# Patient Record
Sex: MALE | Race: WHITE | NOT HISPANIC OR LATINO | Employment: UNEMPLOYED | URBAN - METROPOLITAN AREA
[De-identification: names, ages, dates, MRNs, and addresses within clinical notes are randomized per-mention and may not be internally consistent; named-entity substitution may affect disease eponyms.]

---

## 2017-05-18 ENCOUNTER — OFFICE VISIT (OUTPATIENT)
Dept: URGENT CARE | Age: 22
End: 2017-05-18
Payer: COMMERCIAL

## 2017-05-18 PROCEDURE — S9088 SERVICES PROVIDED IN URGENT: HCPCS | Performed by: FAMILY MEDICINE

## 2017-05-18 PROCEDURE — 99203 OFFICE O/P NEW LOW 30 MIN: CPT | Performed by: FAMILY MEDICINE

## 2017-11-02 ENCOUNTER — ALLSCRIPTS OFFICE VISIT (OUTPATIENT)
Dept: OTHER | Facility: OTHER | Age: 22
End: 2017-11-02

## 2017-11-04 NOTE — PROGRESS NOTES
Assessment  1  Acute pharyngitis (462) (J02 9)   2  Foreign body of ear, right (931) (T16 1XXA)    Plan  Acute pharyngitis    · Amoxicillin-Pot Clavulanate 875-125 MG Oral Tablet; take one tablet twice daily for  10 days   · MethylPREDNISolone 4 MG Oral Tablet; USE AS DIRECTED   · Call [phone] if:  The fever has not gone away in 2 days ; Status:Complete;   Done:  38MUR5655   · Call [phone] if: The symptoms are not better in 7 days ; Status:Complete;   Done:  22RZP9815   · Call [phone] if: You develop a cough ; Status:Complete;   Done: 66GBC7783   · Call [phone] if: You get a rash ; Status:Complete;   Done: 90HAA9567   · Call [phone] if: You have pain in your ear ; Status:Complete;   Done: 48DLO1599   · Call [phone] if: You start vomiting ; Status:Complete;   Done: 74ZEO4008   · Call [phone] if: Your joints are red or painful ; Status:Complete;   Done:  08WWJ3391   · Call [phone] if: Your sore throat is not better in 1 week ; Status:Complete;   Done:  01XFF8422   · Call [phone] if: Your swallowing difficulty is not better in 3 days ; Status:Complete;    Done: 00OMD2556   · Call [phone] if: Your temperature is higher than 101F ; Status:Complete;   Done:  95JIJ5746   · Call 911 if: You have difficulty breathing, or you are short of breath more often ;  Status:Complete;   Done: 15JPH5230   · Seek Immediate Medical Attention if: You become dehydrated ; Status:Complete;   Done:  32BPL9716   · Seek Immediate Medical Attention if: You get a severe headache that seems different  from your usual ones ; Status:Complete;   Done: 16YQM4891   · Seek Immediate Medical Attention if: Your swallowing difficulty is worse ;  Status:Complete;   Done: 67KGU3637   · Drink plenty of fluids ; Status:Complete;   Done: 93GIO4591   · Gargle with warm salt water for 5 minutes every 4 hours ; Status:Complete;   Done:  03RDT3387   · Good hand washing is one of the best ways to control the spread of germs ;  Status:Complete;   Done: 55RWY5454   · Take steps to prevent passing germs to others ; Status:Complete;   Done: 89HFZ7926   · The following home treatments may soothe a sore throat ; Status:Complete;   Done:  34DYR5629    Discussion/Summary    Pharyngitis: Take full course of abx  Will start corse of medrol dose pack as pt with significant edema as well  rest  stay well hydrated  salt water gargles  body R ear: a small piece of black possible plastic irrigated from your R ear  avoid q-tips or foreign objects  feeling better get your influenza  The patient was counseled regarding instructions for management,-- prognosis,-- patient and family education  Chief Complaint  Pt presents for sore throat, headache, sore abdomenyesterday morning  History of Present Illness  Cold Symptoms:   Deborah Fire presents with complaints of gradual onset of constant episodes of mild cold symptoms  Associated symptoms include scratchy throat-- and-- sore throat, but-- no nasal congestion,-- no dry cough,-- no productive cough,-- no facial pressure,-- no facial pain,-- no headache,-- no wheezing,-- no shortness of breath,-- no nausea,-- no vomiting,-- no fever-- and-- no chills  Review of Systems    Constitutional: feeling poorly, but-- no fever,-- no chills-- and-- not feeling tired  ENT: as noted in HPI,-- no earache-- and-- no hearing loss  Cardiovascular: the heart rate was not slow,-- no chest pain,-- the heart rate was not fast-- and-- no palpitations  Respiratory: no shortness of breath-- and-- no cough  Gastrointestinal: no abdominal pain,-- no nausea-- and-- no vomiting  Musculoskeletal: no arthralgias-- and-- no myalgias  Integumentary: no rashes  Neurological: no headache  Active Problems  1  Second degree burn of hand (944 20,948 00) (T20 872A)    Past Medical History  Active Problems And Past Medical History Reviewed:    The active problems and past medical history were reviewed and updated today  Surgical History  Surgical History Reviewed: The surgical history was reviewed and updated today  Social History   · Alcohol use (V49 89) (Z78 9)   · Caffeine use (V49 89) (F15 90)   · Chewing tobacco use (305 1) (Z72 0)   · Current smoker (305 1) (X38 598)   · No illicit drug use  The social history was reviewed and updated today  The social history was reviewed and is unchanged  Family History  Family History Reviewed: The family history was reviewed and updated today  Current Meds    The medication list was reviewed and updated today  Allergies  1  No Known Drug Allergies  2  No Known Environmental Allergies   3  Nuts    Vitals   Recorded: 94DHO7837 10:35AM   Temperature 99 F   Heart Rate 86   Systolic 397, LUE, Sitting   Diastolic 68, LUE, Sitting   BP CUFF SIZE Large   Height 5 ft 11 65 in   Weight 209 lb 3 2 oz   BMI Calculated 28 65   BSA Calculated 2 16   O2 Saturation 98, RA     Physical Exam    Constitutional   General appearance: No acute distress, well appearing and well nourished  Eyes   Conjunctiva and lids: No swelling, erythema, or discharge  Pupils and irises: Equal, round and reactive to light  Ears, Nose, Mouth, and Throat   External inspection of ears and nose: Normal     Otoscopic examination: Abnormal  -- black foreign body to R ear  minimal cerumen B/L  TM normal    Oropharynx: Abnormal   Oral mucosa was normal  There was 3+ enlargement, erythema and swelling of both tonsils exudate  -- MMM  Pulmonary   Respiratory effort: No increased work of breathing or signs of respiratory distress  Auscultation of lungs: Clear to auscultation, equal breath sounds bilaterally, no wheezes, no rales, no rhonci  -- no rhonchi or wheezing  Cardiovascular   Auscultation of heart: Normal rate and rhythm, normal S1 and S2, without murmurs      Lymphatic   Palpation of lymph nodes in neck: Abnormal  -- (+ B/L posterior cervical lymphadenopathy and tenderness)   Musculoskeletal   Gait and station: Normal     Skin   Skin and subcutaneous tissue: Normal without rashes or lesions  Psychiatric   Orientation to person, place and time: Normal     Mood and affect: Normal          Procedure            Indication: foreign body in the right ear  Procedure Note: The procedure was performed by the Provider  A otoscope was placed in the ear canal(s) to visualize the ear canal debris  The ear was cleaned by using warm water irrigation  The procedure was successful  Post-Procedure:   Patient Status: the patient tolerated the procedure well  Complications: there were no complications  Patient instructions: dry ear precautions,-- avoid using q-tips-- and-- Avoid foreign objects  Follow-up as needed  Signatures   Electronically signed by :  TRISH Meyer; Nov 2 2017 11:12AM EST                       (Author)    Electronically signed by : Rosmery Wei DO; Nov  3 2017 12:12PM EST

## 2018-01-12 VITALS
OXYGEN SATURATION: 98 % | TEMPERATURE: 99 F | SYSTOLIC BLOOD PRESSURE: 122 MMHG | HEART RATE: 86 BPM | WEIGHT: 209.2 LBS | DIASTOLIC BLOOD PRESSURE: 68 MMHG | HEIGHT: 72 IN | BODY MASS INDEX: 28.33 KG/M2

## 2018-01-30 ENCOUNTER — OFFICE VISIT (OUTPATIENT)
Dept: URGENT CARE | Facility: MEDICAL CENTER | Age: 23
End: 2018-01-30
Payer: COMMERCIAL

## 2018-01-30 VITALS — HEIGHT: 72 IN | WEIGHT: 207 LBS | TEMPERATURE: 98.6 F | BODY MASS INDEX: 28.04 KG/M2 | RESPIRATION RATE: 20 BRPM

## 2018-01-30 DIAGNOSIS — J02.9 SORE THROAT: Primary | ICD-10-CM

## 2018-01-30 DIAGNOSIS — S76.319A HAMSTRING STRAIN, INITIAL ENCOUNTER: ICD-10-CM

## 2018-01-30 LAB — S PYO AG THROAT QL: NEGATIVE

## 2018-01-30 PROCEDURE — 87430 STREP A AG IA: CPT | Performed by: PHYSICIAN ASSISTANT

## 2018-01-30 PROCEDURE — 87147 CULTURE TYPE IMMUNOLOGIC: CPT | Performed by: PHYSICIAN ASSISTANT

## 2018-01-30 PROCEDURE — 87070 CULTURE OTHR SPECIMN AEROBIC: CPT | Performed by: PHYSICIAN ASSISTANT

## 2018-01-30 PROCEDURE — 99213 OFFICE O/P EST LOW 20 MIN: CPT | Performed by: PHYSICIAN ASSISTANT

## 2018-01-30 PROCEDURE — S9088 SERVICES PROVIDED IN URGENT: HCPCS | Performed by: PHYSICIAN ASSISTANT

## 2018-01-30 NOTE — PATIENT INSTRUCTIONS
Continue Tylenol and Motrin  Can use Mucinex D over-the-counter for cough and congestion  We will check a throat culture but negative rapid strep here  Hamstring strain seems to be from some overuse  If redness or swelling of the leg go to the emergency room  Limit some activity and lifting and jumping for 1-2 weeks  Gentle hamstring stretching and can foam roll over the hamstrings and IT band  If not improved follow up with PCP

## 2018-01-30 NOTE — PROGRESS NOTES
Assessment/Plan:      Diagnoses and all orders for this visit:    Sore throat  -     Throat culture  -     POCT rapid strepA    Hamstring strain, initial encounter        Patient Instructions     Continue Tylenol and Motrin  Can use Mucinex D over-the-counter for cough and congestion  We will check a throat culture but negative rapid strep here  Hamstring strain seems to be from some overuse  If redness or swelling of the leg go to the emergency room  Limit some activity and lifting and jumping for 1-2 weeks  Gentle hamstring stretching and can foam roll over the hamstrings and IT band  If not improved follow up with PCP  Subjective:     Patient ID: Ghada Graves is a 25 y o  male  81-year-old male complains of 2 days of cough sore throat and congestion  He denies fever home  No flu shot this year  His girlfriend is sick  No nausea vomiting  No meds over-the-counter for the sore throat or cough  He also complains of left thigh in the back (posterior ) pain off and on for 3 weeks  He does play football but is out of season  He plays basketball right now and is lifting in the gym  He denies fall or injury  Says the pain is okay when he works out  But actually gets worse as he rest   He gets tight and stiff  No glue pain or lateral IP pain  No pain down below the knee  Review of Systems      Objective:     Physical Exam   Constitutional: He appears well-developed and well-nourished  No distress  HENT:   Right Ear: External ear normal    Left Ear: External ear normal    Nose: Nose normal  Right sinus exhibits no maxillary sinus tenderness and no frontal sinus tenderness  Left sinus exhibits no maxillary sinus tenderness and no frontal sinus tenderness  Mouth/Throat: No oropharyngeal exudate or posterior oropharyngeal erythema  Mild posterior oropharynx erythema  No exudates  Eyes: Conjunctivae and EOM are normal  Pupils are equal, round, and reactive to light   No scleral icterus  Neck: Normal range of motion  Neck supple  Cardiovascular: Normal rate, regular rhythm and normal heart sounds  Pulmonary/Chest: Effort normal and breath sounds normal  No respiratory distress  He has no wheezes  He has no rales  Abdominal: Soft  Bowel sounds are normal  He exhibits no distension and no mass  There is no tenderness  There is no rebound and no guarding  Musculoskeletal:     Left thigh supple mild tenderness along the distal hamstring muscle body but not to the insertions of the hamstrings  He has some tenderness along the proximal gastric  Pain with hamstring stretching and some tightness  He also has pain with gastrocs stretching but no tightness  He has no erythema induration of the skin  No wound  Full range of motion of the knee  No lateral ice he band or greater jemma tenderness  Full rotation of the hip  No glute tenderness  No weakness  Lymphadenopathy:     He has cervical adenopathy  Skin: Skin is warm and dry  No rash noted

## 2018-02-02 ENCOUNTER — TELEPHONE (OUTPATIENT)
Dept: URGENT CARE | Facility: MEDICAL CENTER | Age: 23
End: 2018-02-02

## 2018-02-02 LAB — BACTERIA THROAT CULT: ABNORMAL

## 2018-02-15 ENCOUNTER — OFFICE VISIT (OUTPATIENT)
Dept: URGENT CARE | Facility: MEDICAL CENTER | Age: 23
End: 2018-02-15
Payer: COMMERCIAL

## 2018-02-15 VITALS
HEIGHT: 72 IN | DIASTOLIC BLOOD PRESSURE: 80 MMHG | RESPIRATION RATE: 16 BRPM | SYSTOLIC BLOOD PRESSURE: 118 MMHG | BODY MASS INDEX: 27.9 KG/M2 | WEIGHT: 206 LBS | HEART RATE: 77 BPM

## 2018-02-15 DIAGNOSIS — Z02.5 SPORTS PHYSICAL: Primary | ICD-10-CM

## 2018-02-15 PROCEDURE — 90715 TDAP VACCINE 7 YRS/> IM: CPT

## 2018-02-15 NOTE — PROGRESS NOTES
[facility]        NAME: Alcides Pearl is a 25 y o  male  : 1995    MRN: [id number]  DATE: February 15, 2018  TIME: 5:52 PM    Assessment and Plan   Sports physical [Z02 5]  1  Sports physical  TDAP Vaccine greater than or equal to 8yo         Patient Instructions     Follow up with PCP as needed  Chief Complaint     Chief Complaint   Patient presents with    Annual Exam         History of Present Illness   Alcides Pearl presents to the clinic c/o      This is a 78-year-old male presenting for sports physical   He denies any history of cardiac, pulmonary, neurologic, gastrointestinal, hematologic, genitourinary problems  He does not take any medications  He has had a history left  Quadriceps pain 4 years ago for which she sat out of sports for a week or 2  He also has a history of 1 concussion for which he sat out of  Sports for couple of weeks  He does not know the last time that he had a tetanus vaccine  Review of Systems   Review of Systems   All other systems reviewed and are negative  Current Medications     No long-term prescriptions on file  Current Allergies     Allergies as of 02/15/2018    (No Known Allergies)            The following portions of the patient's history were reviewed and updated as appropriate: allergies, current medications, past family history, past medical history, past social history, past surgical history and problem list     Objective   /80 (Patient Position: Sitting)   Pulse 77   Resp 16   Ht 6' (1 829 m)   Wt 93 4 kg (206 lb)   BMI 27 94 kg/m²        Physical Exam     Physical Exam   Constitutional: He is oriented to person, place, and time  He appears well-developed and well-nourished  No distress  HENT:   Head: Normocephalic  Right Ear: Tympanic membrane, external ear and ear canal normal  No drainage or tenderness  Left Ear: Tympanic membrane, external ear and ear canal normal  No drainage or tenderness     Nose: Nose normal    Mouth/Throat: Oropharynx is clear and moist  No oropharyngeal exudate  Eyes: Conjunctivae and EOM are normal  Pupils are equal, round, and reactive to light  Neck: Normal range of motion  Neck supple  No thyromegaly present  Cardiovascular: Normal rate, regular rhythm, normal heart sounds and intact distal pulses  Pulmonary/Chest: Effort normal and breath sounds normal  No respiratory distress  He has no wheezes  He has no rhonchi  He has no rales  Abdominal: Soft  Bowel sounds are normal  He exhibits no distension and no mass  There is no tenderness  There is no rebound, no guarding, no CVA tenderness, no tenderness at McBurney's point and negative Medina's sign  Musculoskeletal: Normal range of motion  He exhibits no edema, tenderness or deformity  Lymphadenopathy:     He has no cervical adenopathy  Neurological: He is alert and oriented to person, place, and time  He displays normal reflexes  No cranial nerve deficit  He exhibits normal muscle tone  Coordination normal    Skin: Skin is warm and dry  No rash noted  He is not diaphoretic  No pallor  Psychiatric: He has a normal mood and affect

## 2018-02-19 NOTE — PROGRESS NOTES
Addendum to note written 2/15/2018: patient does not know the last time he got a tetanus vaccine  Tdap needed for sports participation, administered in office

## 2018-03-20 ENCOUNTER — OFFICE VISIT (OUTPATIENT)
Dept: INTERNAL MEDICINE CLINIC | Age: 23
End: 2018-03-20
Payer: COMMERCIAL

## 2018-03-20 VITALS
HEIGHT: 72 IN | TEMPERATURE: 97.7 F | DIASTOLIC BLOOD PRESSURE: 76 MMHG | BODY MASS INDEX: 28.44 KG/M2 | OXYGEN SATURATION: 98 % | SYSTOLIC BLOOD PRESSURE: 112 MMHG | WEIGHT: 210 LBS | HEART RATE: 64 BPM

## 2018-03-20 DIAGNOSIS — S76.302S LEFT HAMSTRING INJURY, SEQUELA: Primary | ICD-10-CM

## 2018-03-20 PROBLEM — S76.302A LEFT HAMSTRING INJURY: Status: ACTIVE | Noted: 2018-03-20

## 2018-03-20 PROCEDURE — 3008F BODY MASS INDEX DOCD: CPT | Performed by: PHYSICIAN ASSISTANT

## 2018-03-20 PROCEDURE — 99213 OFFICE O/P EST LOW 20 MIN: CPT | Performed by: PHYSICIAN ASSISTANT

## 2018-03-20 NOTE — ASSESSMENT & PLAN NOTE
Will obtain ultrasound of LLE to further eval for possible popliteal cyst  [facility] staff will set up ultrasound  Tylenol for pain  Warm moist heat  Rest, avoid strenuous activity  Discussed stretches or ROM exercises  Schedule with ortho  Patients mother will call and schedule

## 2018-03-20 NOTE — PATIENT INSTRUCTIONS
Left hamstring injury  Will obtain ultrasound of LLE to further eval for possible popliteal cyst  [facility] staff will set up ultrasound  Tylenol for pain  Warm moist heat  Rest, avoid strenuous activity  Discussed stretches or ROM exercises  Schedule with ortho  Patients mother will call and schedule

## 2018-03-20 NOTE — PROGRESS NOTES
[facility]  Standard Office Visit  Patient ID: Humberto Ortega    : 1995  Age/Gender: 25 y o  male     DATE: 3/20/2018      Assessment/Plan:    Left hamstring injury  Will obtain ultrasound of LLE to further eval for possible popliteal cyst  [facility] staff will set up ultrasound  Tylenol for pain  Warm moist heat  Rest, avoid strenuous activity  Discussed stretches or ROM exercises  Schedule with ortho  Patients mother will call and schedule  Diagnoses and all orders for this visit:    Left hamstring injury, sequela  -     US MSK limited; Future  -     Ambulatory referral to Orthopedic Surgery; Future          Subjective:   Chief Complaint   Patient presents with    Pain     pain in back of left leg, bruising for over one month- pt used tylenol, heating - see visit 18         Humberto Ortega is a 25 y o  male who presents to the office on 3/20/2018 for     24 yo male for pain over L hamstring  Seen prior and was told he strained his hamstring  No known injury  He is active, plays basketball and weightlifter  Pain located behind kin and under L thigh  Hurts the most when he is resting  When he is in chair  Worse with pressure of the area  Position changes worsen his sx  Pain is sharp, throbbing only with pressure over the back of her knee  Worse after he works out or is active  No know prior injury  Mother notes that area appears to be bruised over the area  Bruising over L hamstring  Has not been resting, continues with daily activity and is able to keep up with other kids with activity  Leg Pain    The incident occurred more than 1 week ago  Incident location: No incident patient can recall  There was no injury mechanism  The pain is present in the left leg, left hip and left knee  The pain is at a severity of 2/10  The pain is mild  The pain has been fluctuating since onset   Pertinent negatives include no inability to bear weight, loss of motion, loss of sensation, muscle weakness, numbness or tingling  He reports no foreign bodies present  The symptoms are aggravated by palpation (Worse with pressure over the area when seated)  The treatment provided no relief  The following portions of the patient's history were reviewed and updated as appropriate: allergies, current medications, past family history, past medical history, past social history, past surgical history and problem list     Review of Systems   Respiratory: Negative for cough, shortness of breath and wheezing  Cardiovascular: Negative for chest pain and palpitations  Gastrointestinal: Negative for diarrhea, nausea and vomiting  Musculoskeletal: Negative for arthralgias (no joint swelling), back pain, gait problem, joint swelling and myalgias  Left posterior leg fullness  Neurological: Negative for tingling and numbness  No numbness or tingling         Patient Active Problem List   Diagnosis    Left hamstring injury       Past Medical History:   Diagnosis Date    Anxiety     No known health problems        Past Surgical History:   Procedure Laterality Date    NO PAST SURGERIES         No current outpatient prescriptions on file      No Known Allergies    Social History     Social History    Marital status: Single     Spouse name: N/A    Number of children: N/A    Years of education: N/A     Social History Main Topics    Smoking status: Never Smoker    Smokeless tobacco: Current User     Types: Chew    Alcohol use Yes    Drug use: No    Sexual activity: Not Asked     Other Topics Concern    None     Social History Narrative    Caffeine use       Family History   Problem Relation Age of Onset    Thyroid disease Mother     Hypertension Father     Restless legs syndrome Father     Stroke Maternal Grandmother     Cancer Maternal Grandfather     Cancer Maternal Aunt     Bipolar disorder Paternal Aunt     Anxiety disorder Paternal Aunt        Patient Care Team:  DO blayne Conrad PCP - General    Immunization History   Administered Date(s) Administered    Tdap 02/15/2018        Objective:  Vitals:    03/20/18 1627   BP: 112/76   BP Location: Left arm   Patient Position: Sitting   Cuff Size: Standard   Pulse: 64   Temp: 97 7 °F (36 5 °C)   TempSrc: Tympanic   SpO2: 98%   Weight: 95 3 kg (210 lb)   Height: 5' 11 65" (1 82 m)     Wt Readings from Last 3 Encounters:   03/20/18 95 3 kg (210 lb)   02/15/18 93 4 kg (206 lb)   01/30/18 93 9 kg (207 lb)     Body mass index is 28 76 kg/m²  No exam data present       Physical Exam   Constitutional: He is oriented to person, place, and time  He appears well-developed and well-nourished  No distress  HENT:   Head: Normocephalic and atraumatic  Mouth/Throat: Oropharynx is clear and moist  No oropharyngeal exudate  Eyes: Conjunctivae are normal  Pupils are equal, round, and reactive to light  Right eye exhibits no discharge  Left eye exhibits no discharge  Neck: Neck supple  Cardiovascular: Normal rate, regular rhythm and normal heart sounds  No murmur heard  Pulmonary/Chest: Effort normal and breath sounds normal  No respiratory distress  He has no wheezes  Abdominal: Soft  There is no tenderness  There is no guarding  Musculoskeletal:        Left knee: He exhibits swelling (sweling in posterior fossa) and ecchymosis  He exhibits normal range of motion, no effusion, no deformity, no laceration, no erythema, normal alignment, no LCL laxity, no bony tenderness and no MCL laxity  Tenderness (Mild TTP in posterior fossa with mild fullness ) found  No medial joint line and no lateral joint line tenderness noted  Able to walk no heels and toes  NO calf TTP  Normal PT and DP pulses  No pain with resisted extension of quadriceps  Mild pain with resisted flexion of hamstring at distal hamstring    Neurological: He is alert and oriented to person, place, and time  No gross focal deficits on exam    Skin: Skin is warm  No rash noted  He is not diaphoretic  Mild ecchymosis left distal hamstring at insertion point, yellowing in color in periphery  Psychiatric: He has a normal mood and affect  His behavior is normal  Thought content normal    Nursing note and vitals reviewed  Health Maintenance   Topic Date Due    HIV SCREENING  1995    PNEUMOCOCCAL POLYSACCHARIDE VACCINE AGE 2-64 HIGH RISK  06/25/1998    INFLUENZA VACCINE  09/01/2017    Depression Screening PHQ-9  02/15/2019    DTaP,Tdap,and Td Vaccines (2 - Td) 02/15/2028     No future appointments      [facility]

## 2018-05-29 ENCOUNTER — HOSPITAL ENCOUNTER (EMERGENCY)
Facility: HOSPITAL | Age: 23
Discharge: HOME | End: 2018-05-30
Attending: EMERGENCY MEDICINE | Admitting: INTERNAL MEDICINE
Payer: COMMERCIAL

## 2018-05-29 ENCOUNTER — HOSPITAL ENCOUNTER (OUTPATIENT)
Dept: RADIOLOGY | Facility: HOSPITAL | Age: 23
Discharge: HOME | End: 2018-05-29

## 2018-05-29 ENCOUNTER — APPOINTMENT (EMERGENCY)
Dept: RADIOLOGY | Facility: HOSPITAL | Age: 23
End: 2018-05-29
Payer: COMMERCIAL

## 2018-05-29 DIAGNOSIS — M79.605 LEFT LEG PAIN: Primary | ICD-10-CM

## 2018-05-29 PROCEDURE — 73562 X-RAY EXAM OF KNEE 3: CPT

## 2018-05-29 PROCEDURE — 73552 X-RAY EXAM OF FEMUR 2/>: CPT

## 2018-05-29 PROCEDURE — 96372 THER/PROPH/DIAG INJ SC/IM: CPT

## 2018-05-29 RX ORDER — KETOROLAC TROMETHAMINE 30 MG/ML
15 INJECTION, SOLUTION INTRAMUSCULAR; INTRAVENOUS ONCE
Status: COMPLETED | OUTPATIENT
Start: 2018-05-29 | End: 2018-05-29

## 2018-05-29 RX ADMIN — KETOROLAC TROMETHAMINE 15 MG: 30 INJECTION, SOLUTION INTRAMUSCULAR at 23:34

## 2018-05-30 VITALS
OXYGEN SATURATION: 98 % | DIASTOLIC BLOOD PRESSURE: 72 MMHG | BODY MASS INDEX: 28.76 KG/M2 | HEART RATE: 64 BPM | SYSTOLIC BLOOD PRESSURE: 127 MMHG | RESPIRATION RATE: 18 BRPM | TEMPERATURE: 98.4 F | WEIGHT: 210 LBS

## 2018-05-30 PROCEDURE — 99283 EMERGENCY DEPT VISIT LOW MDM: CPT

## 2018-05-30 NOTE — DISCHARGE INSTRUCTIONS
Leg Pain   WHAT YOU NEED TO KNOW:   Leg pain may be caused by a variety of health conditions  Your tests did not show any broken bones or blood clots  DISCHARGE INSTRUCTIONS:   Return to the emergency department if:   · You have a fever  · Your leg starts to swell  · Your leg pain gets worse  · You have numbness or tingling in your leg or toes  · You cannot put any weight on or move your leg  Contact your healthcare provider if:   · Your pain does not decrease, even after treatment  · You have questions or concerns about your condition or care  Medicines:   · NSAIDs , such as ibuprofen, help decrease swelling, pain, and fever  This medicine is available with or without a doctor's order  NSAIDs can cause stomach bleeding or kidney problems in certain people  If you take blood thinner medicine, always ask your healthcare provider if NSAIDs are safe for you  Always read the medicine label and follow directions  · Take your medicine as directed  Contact your healthcare provider if you think your medicine is not helping or if you have side effects  Tell him of her if you are allergic to any medicine  Keep a list of the medicines, vitamins, and herbs you take  Include the amounts, and when and why you take them  Bring the list or the pill bottles to follow-up visits  Carry your medicine list with you in case of an emergency  Follow up with your healthcare provider as directed: You may need more tests to find the cause of your leg pain  You may need to see an orthopedic specialist or a physical therapist  Write down your questions so you remember to ask them during your visits  Manage your leg pain:   · Rest  your injured leg so that it can heal  You may need an immobilizer, brace, or splint to limit the movement of your leg  You may need to avoid putting any weight on your leg for at least 48 hours  Return to normal activities as directed      · Ice  the injury for 20 minutes every 4 hours for up to 24 hours, or as directed  Use an ice pack, or put crushed ice in a plastic bag  Cover it with a towel to protect your skin  Ice helps prevent tissue damage and decreases swelling and pain  · Elevate  your injured leg above the level of your heart as often as you can  This will help decrease swelling and pain  If possible, prop your leg on pillows or blankets to keep the area elevated comfortably  · Use assistive devices as directed  You may need to use a cane or crutches  Assistive devices help decrease pain and pressure on your leg when you walk  Ask your healthcare provider for more information about assistive devices and how to use them correctly  · Maintain a healthy weight  Extra body weight can cause pressure and pain in your hip, knee, and ankle joints  Ask your healthcare provider how much you should weigh  Ask him to help you create a weight loss plan if you are overweight  © 2017 [address] [employer] Information is for End User's use only and may not be sold, redistributed or otherwise used for commercial purposes  All illustrations and images included in CareNotes® are the copyrighted property of [employer] , [employer]  or Kamlesh Crook  The above information is an  only  It is not intended as medical advice for individual conditions or treatments  Talk to your doctor, nurse or pharmacist before following any medical regimen to see if it is safe and effective for you

## 2018-05-30 NOTE — ED ATTENDING ATTESTATION
Parth Foret MD, saw and evaluated the patient  I have discussed the patient with the resident/non-physician practitioner and agree with the resident's/non-physician practitioner's findings, Plan of Care, and MDM as documented in the resident's/non-physician practitioner's note, except where noted  All available labs and Radiology studies were reviewed  At this point I agree with the current assessment done in the Emergency Department  I have conducted an independent evaluation of this patient a history and physical is as follows:  Pt ahs been having leg pain for 6 months Pt missed us appointment today patient is still able to play basketball intermittently despite this pain no fevers no chills no weight loss    MDM:  Alert NAD tender lateral joint space and fibular head also tender posterior aspect of thigh there is no medial tenderness there is not no iliotibial band tenderness motor 5/5 sensation within normal limits good DP and PT pulses back is nontender MDM:  Will do x-ray knee at this time will offer patient CT scan of leg do recommend patient follows up    Critical Care Time  CritCare Time    Procedures

## 2018-05-30 NOTE — ED PROVIDER NOTES
History  Chief Complaint   Patient presents with    Leg Pain     Pt told he may have "bakers cyst" in left upper leg  Pt was supposed to have US today but did not go  Pt c/o increased leg pain  This is a 43-year-old male who presents with leg pain  The patient states that his pain began approximately 6 months ago, and initially was steady, and mild, but has been progressively worsening since then, especially over the last month or so  He was evaluated by his primary care physician, who was concern for Baker's cyst, so referred him for an ultrasound, which the patient has not yet obtained  The patient states the pain is located primarily in the posterior aspect of his upper leg, approximately at mid thigh, right sharp in nature, and radiating downward past the knee, and toward the lateral aspect of the  Lower leg over the distribution of the fibular head  He states the pain is worse when standing up, worse with movement, and worse after sitting down for a prolonged amount time, but is paradoxically better when active, and specifically, the patient states that he is able to play basketball without difficulty  He states that when he lies flat, he does over time develops take his lower extremity  He denies any fevers, chills, abdominal pain, recent weight gain or weight loss  He denies any trauma to the area  He also denies back  pain            None       Past Medical History:   Diagnosis Date    Anxiety     No known health problems        Past Surgical History:   Procedure Laterality Date    NO PAST SURGERIES         Family History   Problem Relation Age of Onset    Thyroid disease Mother     Hypertension Father     Restless legs syndrome Father     Stroke Maternal Grandmother     Cancer Maternal Grandfather     Cancer Maternal Aunt     Bipolar disorder Paternal Aunt     Anxiety disorder Paternal Aunt      I have reviewed and agree with the history as documented      Social History   Substance Use Topics    Smoking status: Never Smoker    Smokeless tobacco: Current User     Types: Chew    Alcohol use Yes        Review of Systems   Constitutional: Negative for chills, fever and unexpected weight change  HENT: Negative for congestion and sinus pain  Eyes: Negative for photophobia and visual disturbance  Respiratory: Negative for cough and shortness of breath  Cardiovascular: Negative for chest pain and palpitations  Gastrointestinal: Negative for abdominal pain, diarrhea, nausea and vomiting  Endocrine: Negative for polyphagia and polyuria  Genitourinary: Negative for dysuria and urgency  Musculoskeletal: Negative for neck pain and neck stiffness  Skin: Negative for pallor and rash  Neurological: Negative for weakness and light-headedness  Physical Exam  ED Triage Vitals   Temperature Pulse Respirations Blood Pressure SpO2   05/29/18 2226 05/29/18 2224 05/29/18 2224 05/29/18 2224 05/29/18 2224   98 4 °F (36 9 °C) 62 16 121/75 98 %      Temp Source Heart Rate Source Patient Position - Orthostatic VS BP Location FiO2 (%)   05/29/18 2226 05/30/18 0022 05/30/18 0022 05/30/18 0022 --   Oral Monitor Sitting Right arm       Pain Score       05/29/18 2224       9           Orthostatic Vital Signs  Vitals:    05/29/18 2224 05/30/18 0022   BP: 121/75 127/72   Pulse: 62 64   Patient Position - Orthostatic VS:  Sitting       Physical Exam   Constitutional: He is oriented to person, place, and time  Awake and alert, well appearing, no acute distress  Nontoxic in appearance  HENT:   Head: Normocephalic  Mouth/Throat: Oropharynx is clear and moist  No oropharyngeal exudate  Eyes: Pupils are equal, round, and reactive to light  No scleral icterus  Neck: Normal range of motion  No JVD present  Cardiovascular: Normal rate, regular rhythm and normal heart sounds  Pulmonary/Chest: Effort normal  No respiratory distress  He has no wheezes  He has no rales  Abdominal: Soft   He exhibits no distension  There is no tenderness  Musculoskeletal:   There is tenderness to palpation overlying the left ITB band, as well as over the posterior upper leg over the hamstring  Pain is exacerbated with flexion, as well as  Internal rotation at the hip  Gait is normal   No bony deformities appreciated  No mass appreciated  Distal pulses intact  And equal bilaterally  Neurological: He is alert and oriented to person, place, and time  He exhibits normal muscle tone  Skin: Skin is warm and dry  No rash noted  No pallor  ED Medications  Medications   ketorolac (TORADOL) injection 15 mg (15 mg Intramuscular Given 5/29/18 0986)       Diagnostic Studies  Results Reviewed     None                 XR knee 3 views left non injury    (Results Pending)   XR femur 2 views LEFT    (Results Pending)         Procedures  Procedures      Phone Consults  ED Phone Contact    ED Course                               MDM  Number of Diagnoses or Management Options  Left leg pain:   Diagnosis management comments: This is a 42-year-old male who presents with 6 months of progressive left lower extremity pain, over the distribution of his hamstring, as well as ITB band  He is currently hemodynamically stable, well-appearing,  And does not appear to be in any acute distress  On exam, he has significant tenderness over the ITB band, as well as hamstring, concerning for hamstring injury versus ITB band syndrome  Baker's cyst appears less likely, but possible  X-ray of the knee and femur were obtained, which is not displaying any bony injuries  Due to his persistent, smoldering symptoms,  I did offer the patient a CT scan for further evaluation to rule out a mass which could be contributing to his symptoms, however  After shared decision making he declined, and stated that he would prefer to follow up with his primary care physician for further workup as an outpatient   He will follow up with his primary care physician  Strict return precautions were provided  I did also encourage the patient to her continue to pursue the ultrasound that he has already been ordered by his PCP  CritCare Time    Disposition  Final diagnoses:   Left leg pain     Time reflects when diagnosis was documented in both MDM as applicable and the Disposition within this note     Time User Action Codes Description Comment    5/30/2018 12:43 AM Leeanna Parish Add [M79 605] Left leg pain       ED Disposition     ED Disposition Condition Comment    Discharge  Cindi Banksol discharge to home/self care  Condition at discharge: Good        Follow-up Information    None         There are no discharge medications for this patient  No discharge procedures on file  ED Provider  Attending physically available and evaluated [place]  I managed the patient along with the ED Attending      Electronically Signed by         Pauline English MD  05/30/18 1468

## 2018-06-28 ENCOUNTER — HOSPITAL ENCOUNTER (OUTPATIENT)
Dept: RADIOLOGY | Facility: HOSPITAL | Age: 23
Discharge: HOME | End: 2018-06-28
Payer: COMMERCIAL

## 2018-06-28 ENCOUNTER — TRANSCRIBE ORDERS (OUTPATIENT)
Dept: RADIOLOGY | Facility: HOSPITAL | Age: 23
End: 2018-06-28

## 2018-06-28 DIAGNOSIS — S76.302S LEFT HAMSTRING INJURY, SEQUELA: ICD-10-CM

## 2018-06-28 PROCEDURE — 76882 US LMTD JT/FCL EVL NVASC XTR: CPT

## 2020-08-15 ENCOUNTER — APPOINTMENT (OUTPATIENT)
Dept: RADIOLOGY | Age: 25
End: 2020-08-15
Payer: COMMERCIAL

## 2020-08-15 ENCOUNTER — OFFICE VISIT (OUTPATIENT)
Dept: URGENT CARE | Age: 25
End: 2020-08-15
Payer: COMMERCIAL

## 2020-08-15 VITALS
BODY MASS INDEX: 29.12 KG/M2 | HEIGHT: 72 IN | RESPIRATION RATE: 21 BRPM | HEART RATE: 81 BPM | OXYGEN SATURATION: 98 % | WEIGHT: 215 LBS | SYSTOLIC BLOOD PRESSURE: 118 MMHG | DIASTOLIC BLOOD PRESSURE: 66 MMHG | TEMPERATURE: 98.1 F

## 2020-08-15 DIAGNOSIS — M54.42 ACUTE BILATERAL LOW BACK PAIN WITH LEFT-SIDED SCIATICA: Primary | ICD-10-CM

## 2020-08-15 DIAGNOSIS — M54.50 ACUTE BILATERAL LOW BACK PAIN WITHOUT SCIATICA: ICD-10-CM

## 2020-08-15 PROCEDURE — G0382 LEV 3 HOSP TYPE B ED VISIT: HCPCS | Performed by: PHYSICIAN ASSISTANT

## 2020-08-15 RX ORDER — CYCLOBENZAPRINE HCL 5 MG
5 TABLET ORAL 3 TIMES DAILY PRN
Qty: 9 TABLET | Refills: 0 | Status: SHIPPED | OUTPATIENT
Start: 2020-08-15 | End: 2020-10-14

## 2020-08-15 RX ORDER — PREDNISONE 10 MG/1
TABLET ORAL
Qty: 1 EACH | Refills: 0 | Status: SHIPPED | OUTPATIENT
Start: 2020-08-15 | End: 2020-09-30

## 2020-08-15 NOTE — PATIENT INSTRUCTIONS
Heat/Ice to the area  Take prednisone/steroids and Muscle relaxers as prescribed  Do not take muscle relaxers 12 hours before driving, working or operating machinery as they can make people drowsy  Do not take steroids with other NSAIDs such as ibuprofen/motrin/aleve  Follow-up with PCP in the next 1-2 days for further evaluation and to ensure resolution of symptoms  Referral for spine center was placed- call them today to schedule an appointment  Go to an ED immediately if any chest pain, shortness of breath, abdominal pain, fevers, bowel/bladder dysfunction, numbness, tingling, weakness, worsening pain, or other concerning symptoms

## 2020-08-15 NOTE — PROGRESS NOTES
[address] [facility]        NAME: Gladis Rodriguez is a 26 y o  male  : 1995    MRN: [id number]  DATE: August 15, 2020  TIME: 7:48 PM    Assessment and Plan   Acute bilateral low back pain with left-sided sciatica [M54 42]  1  Acute bilateral low back pain with left-sided sciatica  predniSONE 10 MG (21) TBPK    cyclobenzaprine (FLEXERIL) 5 mg tablet    CANCELED: XR spine lumbar 2 or 3 views injury    CANCELED: XR tibia fibula 2 vw left     Patient declined x-rays at this time, states it feels like a muscle ache  No injury or trauma  No midline/bony tenderness  No leg tenderness but states sometimes the pain shoots from the left side of the lower back down the left leg  States it feels like previous muscle spasm/low back pain and sciatica  No red flag symptoms  Declines need for ER transfer this time  Agrees to follow up with PCP and Spine Center or the ER if any new or worsening symptoms    Patient Instructions     Heat/Ice to the area  Take prednisone/steroids and Muscle relaxers as prescribed  Do not take muscle relaxers 12 hours before driving, working or operating machinery as they can make people drowsy  Do not take steroids with other NSAIDs such as ibuprofen/motrin/aleve  Follow-up with PCP in the next 1-2 days for further evaluation and to ensure resolution of symptoms  Referral for spine center was placed- call them today to schedule an appointment  Go to an ED immediately if any chest pain, shortness of breath, abdominal pain, fevers, bowel/bladder dysfunction, numbness, tingling, weakness, worsening pain, or other concerning symptoms  Chief Complaint     Chief Complaint   Patient presents with    Back Pain     lower back pain that radiates down left leg fell while playing basket ball today          History of Present Illness       71-year-old male presents with bilateral back pain times 3-4 days  States it feels very achy and the muscles feel tight and spasms     Denies any injuries, falls or trauma but thinks he may have slept wrong  States today while he was playing basketball he started having some pain from the left side of the lower back shooting down the left leg with certain movements such as turning side to side or bending forward  States it feels like previous muscle spasm/back aches and sciatica  Denies any current like pain or radiation of pain down the leg  Denies any calf or other leg pain or swelling  States pain is worse with turning side to side or bending forward, better at rest   Denies any numbness, tingling, weakness, bowel/bladder dysfunction, saddle anesthesia, abdominal pain, chest pain, shortness of breath, nausea, vomiting, urinary symptoms, fevers or other complaints  States he has not yet tried heat or ice to the area  States he did try a muscle relaxer with some relief  States does not have any more muscle relaxers  Review of Systems   Review of Systems   Constitutional: Negative for activity change, appetite change, fatigue and fever  HENT: Negative  Respiratory: Negative for cough, chest tightness, shortness of breath and wheezing  Cardiovascular: Negative for chest pain and leg swelling  Gastrointestinal: Negative for abdominal pain, diarrhea, nausea and vomiting  Genitourinary: Negative for difficulty urinating, dysuria, flank pain, hematuria, penile swelling, scrotal swelling, testicular pain and urgency  Musculoskeletal: Positive for back pain  Negative for arthralgias, joint swelling, neck pain and neck stiffness  Skin: Negative for rash and wound  Neurological: Negative for dizziness, syncope, weakness, numbness and headaches  All other systems reviewed and are negative          Current Medications       Current Outpatient Medications:     cyclobenzaprine (FLEXERIL) 5 mg tablet, Take 1 tablet (5 mg total) by mouth 3 (three) times a day as needed for muscle spasms for up to 3 days, Disp: 9 tablet, Rfl: 0   predniSONE 10 MG (21) TBPK, Take 6tabs(60mg) on Day 1, 5tabs(50mg) on Day 2, 4tabs(40mg) on day 3, 3tabs(30mg) on Day 4, 2tabs(20mg) on Day 5, 1tab(10mg) on Day 6, Disp: 1 each, Rfl: 0    Current Allergies     Allergies as of 08/15/2020 - Reviewed 08/15/2020   Allergen Reaction Noted    Nuts  05/29/2018            The following portions of the patient's history were reviewed and updated as appropriate: allergies, current medications, past family history, past medical history, past social history, past surgical history and problem list      Past Medical History:   Diagnosis Date    Anxiety     No known health problems        Past Surgical History:   Procedure Laterality Date    NO PAST SURGERIES         Family History   Problem Relation Age of Onset    Thyroid disease Mother     Hypertension Father     Restless legs syndrome Father     Stroke Maternal Grandmother     Cancer Maternal Grandfather     Cancer Maternal Aunt     Bipolar disorder Paternal Aunt     Anxiety disorder Paternal Aunt          Medications have been verified  Objective   /66   Pulse 81   Temp 98 1 °F (36 7 °C)   Resp 21   Ht 6' (1 829 m)   Wt 97 5 kg (215 lb)   SpO2 98%   BMI 29 16 kg/m²        Physical Exam     Physical Exam  Vitals signs and nursing note reviewed  Constitutional:       General: He is not in acute distress  Appearance: He is well-developed  Neck:      Musculoskeletal: Normal range of motion and neck supple  Normal range of motion  No spinous process tenderness  Cardiovascular:      Rate and Rhythm: Normal rate and regular rhythm  Heart sounds: Normal heart sounds  Pulmonary:      Effort: Pulmonary effort is normal       Breath sounds: Normal breath sounds  No wheezing  Abdominal:      General: Bowel sounds are normal       Palpations: Abdomen is soft  Tenderness: There is no abdominal tenderness  There is no guarding     Musculoskeletal:      Cervical back: Normal  He exhibits normal range of motion and no bony tenderness  Thoracic back: Normal  He exhibits normal range of motion and no bony tenderness  Lumbar back: Normal  He exhibits normal range of motion (But mild pain with movement in all directions), no bony tenderness and normal pulse  Back:       Comments: + left straight leg raise  Dorsiflexion/plantar flexion intact  No calf or other leg swelling or tenderness to palpation   Skin:     Capillary Refill: Capillary refill takes less than 2 seconds  Neurological:      Mental Status: He is alert and oriented to person, place, and time  Deep Tendon Reflexes: Reflexes are normal and symmetric  Comments: NV intact with sensation and strong peripheral pulses   5/5 strength of LE bilaterally   Psychiatric:         Behavior: Behavior normal

## 2020-08-17 ENCOUNTER — TELEPHONE (OUTPATIENT)
Dept: PHYSICAL THERAPY | Facility: OTHER | Age: 25
End: 2020-08-17

## 2020-08-17 NOTE — TELEPHONE ENCOUNTER
Nurse reached out to discuss recent referral placed to  [facility]  Patients phone message states, " the wireless customer is currently unavailable  Please try your call again later"       Will defer for f/u attempt per protocol

## 2020-08-19 ENCOUNTER — TELEPHONE (OUTPATIENT)
Dept: PHYSICAL THERAPY | Facility: OTHER | Age: 25
End: 2020-08-19

## 2020-08-19 NOTE — TELEPHONE ENCOUNTER
Nurse reached out to discuss referral placed to [facility]- unable to contact on first attempt  Mother answered and took detailed message and stated she will give the patient all info  Reminded to LM with full name and   CB will come from a non- line  Appreciative of call      Referral closed per protocol

## 2020-09-30 ENCOUNTER — HOSPITAL ENCOUNTER (EMERGENCY)
Facility: HOSPITAL | Age: 25
Discharge: HOME | End: 2020-09-30
Attending: EMERGENCY MEDICINE
Payer: COMMERCIAL

## 2020-09-30 ENCOUNTER — APPOINTMENT (EMERGENCY)
Dept: CT IMAGING | Facility: HOSPITAL | Age: 25
End: 2020-09-30
Payer: COMMERCIAL

## 2020-09-30 ENCOUNTER — APPOINTMENT (EMERGENCY)
Dept: MRI IMAGING | Facility: HOSPITAL | Age: 25
End: 2020-09-30
Payer: COMMERCIAL

## 2020-09-30 ENCOUNTER — OFFICE VISIT (OUTPATIENT)
Dept: INTERNAL MEDICINE CLINIC | Age: 25
End: 2020-09-30
Payer: COMMERCIAL

## 2020-09-30 VITALS
SYSTOLIC BLOOD PRESSURE: 122 MMHG | WEIGHT: 225 LBS | BODY MASS INDEX: 30.48 KG/M2 | HEART RATE: 78 BPM | HEIGHT: 72 IN | TEMPERATURE: 99 F | DIASTOLIC BLOOD PRESSURE: 72 MMHG | OXYGEN SATURATION: 98 %

## 2020-09-30 VITALS
HEART RATE: 74 BPM | TEMPERATURE: 98.4 F | SYSTOLIC BLOOD PRESSURE: 145 MMHG | RESPIRATION RATE: 18 BRPM | DIASTOLIC BLOOD PRESSURE: 71 MMHG | OXYGEN SATURATION: 94 %

## 2020-09-30 DIAGNOSIS — M54.10 RADICULOPATHY: ICD-10-CM

## 2020-09-30 DIAGNOSIS — R33.9 URINARY RETENTION: ICD-10-CM

## 2020-09-30 DIAGNOSIS — M54.42 ACUTE LEFT-SIDED LOW BACK PAIN WITH LEFT-SIDED SCIATICA: Primary | ICD-10-CM

## 2020-09-30 DIAGNOSIS — R20.2 PARESTHESIA OF LEFT LEG: ICD-10-CM

## 2020-09-30 DIAGNOSIS — M51.26 LUMBAR DISC HERNIATION: Primary | ICD-10-CM

## 2020-09-30 DIAGNOSIS — E66.09 CLASS 1 OBESITY DUE TO EXCESS CALORIES WITHOUT SERIOUS COMORBIDITY WITH BODY MASS INDEX (BMI) OF 30.0 TO 30.9 IN ADULT: ICD-10-CM

## 2020-09-30 PROBLEM — E66.811 CLASS 1 OBESITY DUE TO EXCESS CALORIES WITHOUT SERIOUS COMORBIDITY WITH BODY MASS INDEX (BMI) OF 30.0 TO 30.9 IN ADULT: Status: ACTIVE | Noted: 2020-09-30

## 2020-09-30 PROCEDURE — 72131 CT LUMBAR SPINE W/O DYE: CPT

## 2020-09-30 PROCEDURE — G1004 CDSM NDSC: HCPCS

## 2020-09-30 PROCEDURE — 51798 US URINE CAPACITY MEASURE: CPT

## 2020-09-30 PROCEDURE — 96372 THER/PROPH/DIAG INJ SC/IM: CPT

## 2020-09-30 PROCEDURE — 99214 OFFICE O/P EST MOD 30 MIN: CPT | Performed by: INTERNAL MEDICINE

## 2020-09-30 PROCEDURE — 99285 EMERGENCY DEPT VISIT HI MDM: CPT | Performed by: PHYSICIAN ASSISTANT

## 2020-09-30 PROCEDURE — 99284 EMERGENCY DEPT VISIT MOD MDM: CPT

## 2020-09-30 PROCEDURE — 72148 MRI LUMBAR SPINE W/O DYE: CPT

## 2020-09-30 RX ORDER — KETOROLAC TROMETHAMINE 30 MG/ML
30 INJECTION, SOLUTION INTRAMUSCULAR; INTRAVENOUS ONCE
Status: COMPLETED | OUTPATIENT
Start: 2020-09-30 | End: 2020-09-30

## 2020-09-30 RX ORDER — CYCLOBENZAPRINE HCL 10 MG
10 TABLET ORAL ONCE
Status: COMPLETED | OUTPATIENT
Start: 2020-09-30 | End: 2020-09-30

## 2020-09-30 RX ORDER — OXYCODONE HYDROCHLORIDE 5 MG/1
5 TABLET ORAL ONCE
Status: COMPLETED | OUTPATIENT
Start: 2020-09-30 | End: 2020-09-30

## 2020-09-30 RX ORDER — METHYLPREDNISOLONE 4 MG/1
TABLET ORAL
Qty: 21 TABLET | Refills: 0 | Status: SHIPPED | OUTPATIENT
Start: 2020-09-30 | End: 2020-10-14

## 2020-09-30 RX ORDER — METHOCARBAMOL 500 MG/1
500 TABLET, FILM COATED ORAL 2 TIMES DAILY
Qty: 20 TABLET | Refills: 0 | Status: SHIPPED | OUTPATIENT
Start: 2020-09-30 | End: 2020-10-14

## 2020-09-30 RX ORDER — OXYCODONE HYDROCHLORIDE 5 MG/1
5 TABLET ORAL EVERY 6 HOURS PRN
Qty: 6 TABLET | Refills: 0 | Status: SHIPPED | OUTPATIENT
Start: 2020-09-30 | End: 2020-10-10

## 2020-09-30 RX ORDER — NAPROXEN 500 MG/1
500 TABLET ORAL 2 TIMES DAILY WITH MEALS
Qty: 30 TABLET | Refills: 0 | Status: SHIPPED | OUTPATIENT
Start: 2020-09-30 | End: 2020-12-23

## 2020-09-30 RX ADMIN — OXYCODONE HYDROCHLORIDE 5 MG: 5 TABLET ORAL at 13:04

## 2020-09-30 RX ADMIN — CYCLOBENZAPRINE HYDROCHLORIDE 10 MG: 10 TABLET, FILM COATED ORAL at 13:04

## 2020-09-30 RX ADMIN — OXYCODONE HYDROCHLORIDE 5 MG: 5 TABLET ORAL at 18:36

## 2020-09-30 RX ADMIN — KETOROLAC TROMETHAMINE 30 MG: 30 INJECTION, SOLUTION INTRAMUSCULAR at 13:04

## 2020-09-30 NOTE — PROGRESS NOTES
Assessment/Plan:      Acute left-sided low back pain with urinary retention and paresthesia left lower extremity  - possibly secondary to acute nerve impingement to rule out cauda equina  -patient will require MRI of the lumbar sacral spine  -we will transfer patient to the emergency department for stat MRI    Obesity  -diet and exercise counseling given    BMI Counseling: Body mass index is 30 52 kg/m²  The BMI is above normal  Nutrition recommendations include encouraging healthy choices of fruits and vegetables, consuming healthier snacks, moderation in carbohydrate intake and reducing intake of saturated and trans fat  Exercise recommendations include moderate physical activity 150 minutes/week  No pharmacotherapy was ordered  Patient referred to PCP due to patient being overweight  Diagnoses and all orders for this visit:    Acute left-sided low back pain with left-sided sciatica  -     Transfer to other facility    Urinary retention    Paresthesia of left leg    Class 1 obesity due to excess calories without serious comorbidity with body mass index (BMI) of 30 0 to 30 9 in adult          Subjective:      Patient ID: Bryant Irwin is a 26 y o  male  HPI  Patient presents with his mother with complaints of 10/10 left gluteal pain for the past 2 days  He states that he 1st started having low back pain after playing basketball about a month ago and describes the pain as achy but states that it has never been this severe since it started  Pain has been intermittent for the past 1 month  He has been to the urgent care center as well as a chiropractor but pain has not really resolved  He does not remember what he did to worsen the pain about 2 days ago but states that the pain increased from an 8/10 to 10/10  He denies any fall, motor vehicle accident, trauma, heavy lifting or excessive exercise prior to worsening of pain  He denies any manipulation by the chiropractor or any cracking of his back  He states that this morning he developed a difficulty urinating and also has numbness radiating down his left lower extremity  He denies any muscle weakness or urinary incontinence, fecal incontinence or retention  He denies fever, chills, night sweats, chest pain, shortness of breath, palpitations, nausea, vomiting abdominal pain, diarrhea, constipation  Earlier this morning, he went to his chiropractor's office and was told that he would need imaging and medications which the chiropractor could not order  The following portions of the patient's history were reviewed and updated as appropriate:   He  has a past medical history of Anxiety and No known health problems  He   Patient Active Problem List    Diagnosis Date Noted    Acute left-sided low back pain with left-sided sciatica 09/30/2020    Urinary retention 09/30/2020    Paresthesia of left leg 09/30/2020    Class 1 obesity due to excess calories without serious comorbidity with body mass index (BMI) of 30 0 to 30 9 in adult 09/30/2020    Left hamstring injury 03/20/2018     He  has a past surgical history that includes No past surgeries  His family history includes Anxiety disorder in his paternal aunt; Bipolar disorder in his paternal aunt; Cancer in his maternal aunt and maternal grandfather; Hypertension in his father; Restless legs syndrome in his father; Stroke in his maternal grandmother; Thyroid disease in his mother  He  reports that he has never smoked  His smokeless tobacco use includes chew  He reports current alcohol use  He reports that he does not use drugs  No current facility-administered medications for this visit  Current Outpatient Medications   Medication Sig Dispense Refill    cyclobenzaprine (FLEXERIL) 5 mg tablet Take 1 tablet (5 mg total) by mouth 3 (three) times a day as needed for muscle spasms for up to 3 days 9 tablet 0     No current facility-administered medications on file prior to visit        Current Outpatient Medications on File Prior to Visit   Medication Sig    cyclobenzaprine (FLEXERIL) 5 mg tablet Take 1 tablet (5 mg total) by mouth 3 (three) times a day as needed for muscle spasms for up to 3 days    [DISCONTINUED] predniSONE 10 MG (21) TBPK Take 6tabs(60mg) on Day 1, 5tabs(50mg) on Day 2, 4tabs(40mg) on day 3, 3tabs(30mg) on Day 4, 2tabs(20mg) on Day 5, 1tab(10mg) on Day 6     He is allergic to nuts       Review of Systems   Constitutional: Negative for activity change, chills, fatigue, fever and unexpected weight change  HENT: Negative for ear pain, postnasal drip, rhinorrhea, sinus pressure and sore throat  Eyes: Negative for pain  Respiratory: Negative for cough, choking, chest tightness, shortness of breath and wheezing  Cardiovascular: Negative for chest pain, palpitations and leg swelling  Gastrointestinal: Negative for abdominal pain, constipation, diarrhea, nausea and vomiting  Genitourinary: Negative for dysuria and hematuria  Musculoskeletal: Positive for back pain (Left lower back pain in the gluteal region especially)  Negative for arthralgias, gait problem, joint swelling, myalgias and neck stiffness  Skin: Negative for pallor and rash  Neurological: Positive for numbness ( left  leg numbness)  Negative for dizziness, tremors, seizures, syncope, light-headedness and headaches  Hematological: Negative for adenopathy  Psychiatric/Behavioral: Negative for behavioral problems  Objective:      /72 (BP Location: Left arm, Patient Position: Sitting, Cuff Size: Large)   Pulse 78   Temp 99 °F (37 2 °C) (Temporal)   Ht 6' (1 829 m)   Wt 102 kg (225 lb) Comment: self reported  SpO2 98%   BMI 30 52 kg/m²          Physical Exam  Constitutional:       General: He is not in acute distress  Appearance: He is well-developed  He is not diaphoretic  HENT:      Head: Normocephalic and atraumatic        Right Ear: External ear normal       Left Ear: External ear normal       Nose: Nose normal       Mouth/Throat:      Mouth: Mucous membranes are dry  Pharynx: No oropharyngeal exudate  Comments: Dry mucous membranes  Eyes:      General: No scleral icterus  Right eye: No discharge  Left eye: No discharge  Conjunctiva/sclera: Conjunctivae normal       Pupils: Pupils are equal, round, and reactive to light  Neck:      Musculoskeletal: Normal range of motion and neck supple  Thyroid: No thyromegaly  Vascular: No JVD  Trachea: No tracheal deviation  Cardiovascular:      Rate and Rhythm: Normal rate and regular rhythm  Heart sounds: Normal heart sounds  No murmur  No friction rub  No gallop  Pulmonary:      Effort: Pulmonary effort is normal  No respiratory distress  Breath sounds: Normal breath sounds  No wheezing or rales  Chest:      Chest wall: No tenderness  Abdominal:      General: Bowel sounds are normal  There is no distension  Palpations: Abdomen is soft  There is no mass  Tenderness: There is no abdominal tenderness  There is no guarding or rebound  Musculoskeletal: Normal range of motion  General: No tenderness or deformity  Lumbar back: He exhibits spasm (Paraspinal muscle hypertonicity in the  lumbar and sacral region)  Legs:    Lymphadenopathy:      Cervical: No cervical adenopathy  Skin:     General: Skin is warm and dry  Coloration: Skin is not pale  Findings: No erythema or rash  Neurological:      Mental Status: He is alert and oriented to person, place, and time  Cranial Nerves: No cranial nerve deficit  Motor: No abnormal muscle tone  Coordination: Coordination normal       Deep Tendon Reflexes: Reflexes are normal and symmetric  Psychiatric:         Behavior: Behavior normal            No visits with results within 12 Month(s) from this visit     Latest known visit with results is:   Office Visit on 01/30/2018   Component Date Value Ref Range Status    Throat Culture 01/30/2018 1+ Growth of Beta Hemolytic Streptococcus Group C*  Final    This organism is intrinisically susceptible to Penicillin  If sensitivites to other antibiotics are required, please call the Microbiology Department at [phone] within 5 days       RAPID STREP A 01/30/2018 Negative  Negative Final

## 2020-10-01 ENCOUNTER — TELEPHONE (OUTPATIENT)
Dept: NEUROSURGERY | Facility: CLINIC | Age: 25
End: 2020-10-01

## 2020-10-01 ENCOUNTER — TELEPHONE (OUTPATIENT)
Dept: OTHER | Facility: HOSPITAL | Age: 25
End: 2020-10-01

## 2020-10-02 ENCOUNTER — TELEPHONE (OUTPATIENT)
Dept: NEUROSURGERY | Facility: CLINIC | Age: 25
End: 2020-10-02

## 2020-10-08 ENCOUNTER — OFFICE VISIT (OUTPATIENT)
Dept: NEUROSURGERY | Facility: CLINIC | Age: 25
End: 2020-10-08
Payer: COMMERCIAL

## 2020-10-08 VITALS
SYSTOLIC BLOOD PRESSURE: 154 MMHG | HEIGHT: 72 IN | TEMPERATURE: 97.6 F | DIASTOLIC BLOOD PRESSURE: 98 MMHG | RESPIRATION RATE: 16 BRPM | HEART RATE: 60 BPM | BODY MASS INDEX: 30.48 KG/M2 | WEIGHT: 225 LBS

## 2020-10-08 DIAGNOSIS — M54.16 LUMBAR RADICULOPATHY: Primary | ICD-10-CM

## 2020-10-08 PROCEDURE — 99244 OFF/OP CNSLTJ NEW/EST MOD 40: CPT | Performed by: PHYSICIAN ASSISTANT

## 2020-10-08 RX ORDER — GABAPENTIN 300 MG/1
300 CAPSULE ORAL 3 TIMES DAILY
Qty: 30 CAPSULE | Refills: 0 | Status: SHIPPED | OUTPATIENT
Start: 2020-10-08 | End: 2020-11-10 | Stop reason: ALTCHOICE

## 2020-10-09 ENCOUNTER — TELEPHONE (OUTPATIENT)
Dept: PAIN MEDICINE | Facility: CLINIC | Age: 25
End: 2020-10-09

## 2020-10-13 ENCOUNTER — TELEPHONE (OUTPATIENT)
Dept: PAIN MEDICINE | Facility: CLINIC | Age: 25
End: 2020-10-13

## 2020-10-14 ENCOUNTER — CONSULT (OUTPATIENT)
Dept: PAIN MEDICINE | Facility: CLINIC | Age: 25
End: 2020-10-14
Payer: COMMERCIAL

## 2020-10-14 ENCOUNTER — TRANSCRIBE ORDERS (OUTPATIENT)
Dept: PAIN MEDICINE | Facility: CLINIC | Age: 25
End: 2020-10-14

## 2020-10-14 VITALS
HEART RATE: 92 BPM | BODY MASS INDEX: 30.52 KG/M2 | TEMPERATURE: 98.3 F | SYSTOLIC BLOOD PRESSURE: 146 MMHG | WEIGHT: 225 LBS | DIASTOLIC BLOOD PRESSURE: 95 MMHG

## 2020-10-14 DIAGNOSIS — M51.16 INTERVERTEBRAL DISC DISORDER WITH RADICULOPATHY OF LUMBAR REGION: Primary | ICD-10-CM

## 2020-10-14 PROCEDURE — 99244 OFF/OP CNSLTJ NEW/EST MOD 40: CPT | Performed by: ANESTHESIOLOGY

## 2020-10-15 ENCOUNTER — TELEPHONE (OUTPATIENT)
Dept: PAIN MEDICINE | Facility: CLINIC | Age: 25
End: 2020-10-15

## 2020-10-20 ENCOUNTER — TELEPHONE (OUTPATIENT)
Dept: PAIN MEDICINE | Facility: CLINIC | Age: 25
End: 2020-10-20

## 2020-10-28 ENCOUNTER — TRANSCRIBE ORDERS (OUTPATIENT)
Dept: PAIN MEDICINE | Facility: CLINIC | Age: 25
End: 2020-10-28

## 2020-11-10 ENCOUNTER — HOSPITAL ENCOUNTER (OUTPATIENT)
Dept: RADIOLOGY | Facility: CLINIC | Age: 25
Discharge: HOME | End: 2020-11-10
Attending: ANESTHESIOLOGY | Admitting: ANESTHESIOLOGY
Payer: COMMERCIAL

## 2020-11-10 VITALS
HEART RATE: 75 BPM | DIASTOLIC BLOOD PRESSURE: 72 MMHG | RESPIRATION RATE: 20 BRPM | SYSTOLIC BLOOD PRESSURE: 113 MMHG | TEMPERATURE: 98.2 F | OXYGEN SATURATION: 96 %

## 2020-11-10 DIAGNOSIS — M51.16 INTERVERTEBRAL DISC DISORDER WITH RADICULOPATHY OF LUMBAR REGION: ICD-10-CM

## 2020-11-10 PROCEDURE — 64483 NJX AA&/STRD TFRM EPI L/S 1: CPT | Performed by: ANESTHESIOLOGY

## 2020-11-10 PROCEDURE — 64484 NJX AA&/STRD TFRM EPI L/S EA: CPT | Performed by: ANESTHESIOLOGY

## 2020-11-10 RX ORDER — METHYLPREDNISOLONE ACETATE 80 MG/ML
80 INJECTION, SUSPENSION INTRA-ARTICULAR; INTRALESIONAL; INTRAMUSCULAR; PARENTERAL; SOFT TISSUE ONCE
Status: COMPLETED | OUTPATIENT
Start: 2020-11-10 | End: 2020-11-10

## 2020-11-10 RX ORDER — BUPIVACAINE HCL/PF 2.5 MG/ML
10 VIAL (ML) INJECTION ONCE
Status: COMPLETED | OUTPATIENT
Start: 2020-11-10 | End: 2020-11-10

## 2020-11-10 RX ORDER — 0.9 % SODIUM CHLORIDE 0.9 %
10 VIAL (ML) INJECTION ONCE
Status: COMPLETED | OUTPATIENT
Start: 2020-11-10 | End: 2020-11-10

## 2020-11-10 RX ADMIN — Medication 5 ML: at 09:39

## 2020-11-10 RX ADMIN — SODIUM CHLORIDE 5 ML: 9 INJECTION, SOLUTION INTRAMUSCULAR; INTRAVENOUS; SUBCUTANEOUS at 09:39

## 2020-11-10 RX ADMIN — IOHEXOL 1 ML: 300 INJECTION, SOLUTION INTRAVENOUS at 09:42

## 2020-11-10 RX ADMIN — METHYLPREDNISOLONE ACETATE 80 MG: 80 INJECTION, SUSPENSION INTRA-ARTICULAR; INTRALESIONAL; INTRAMUSCULAR; PARENTERAL; SOFT TISSUE at 09:43

## 2020-11-10 RX ADMIN — BUPIVACAINE HYDROCHLORIDE 2 ML: 2.5 INJECTION, SOLUTION EPIDURAL; INFILTRATION; INTRACAUDAL at 09:43

## 2020-11-17 ENCOUNTER — TELEPHONE (OUTPATIENT)
Dept: PAIN MEDICINE | Facility: CLINIC | Age: 25
End: 2020-11-17

## 2020-12-23 ENCOUNTER — TELEMEDICINE (OUTPATIENT)
Dept: INTERNAL MEDICINE CLINIC | Facility: CLINIC | Age: 25
End: 2020-12-23
Payer: COMMERCIAL

## 2020-12-23 VITALS — TEMPERATURE: 98.6 F

## 2020-12-23 DIAGNOSIS — B34.9 VIRAL INFECTION, UNSPECIFIED: ICD-10-CM

## 2020-12-23 DIAGNOSIS — Z20.822 EXPOSURE TO COVID-19 VIRUS: ICD-10-CM

## 2020-12-23 LAB — SARS-COV-2 RNA RESP QL NAA+PROBE: POSITIVE

## 2020-12-23 PROCEDURE — 99213 OFFICE O/P EST LOW 20 MIN: CPT | Performed by: INTERNAL MEDICINE

## 2020-12-23 PROCEDURE — 87635 SARS-COV-2 COVID-19 AMP PRB: CPT

## 2020-12-24 ENCOUNTER — TELEPHONE (OUTPATIENT)
Dept: INTERNAL MEDICINE CLINIC | Age: 25
End: 2020-12-24

## 2022-10-27 ENCOUNTER — OFFICE VISIT (OUTPATIENT)
Dept: URGENT CARE | Facility: MEDICAL CENTER | Age: 27
End: 2022-10-27

## 2022-10-27 VITALS
BODY MASS INDEX: 29.16 KG/M2 | DIASTOLIC BLOOD PRESSURE: 79 MMHG | RESPIRATION RATE: 18 BRPM | HEIGHT: 73 IN | OXYGEN SATURATION: 96 % | SYSTOLIC BLOOD PRESSURE: 124 MMHG | TEMPERATURE: 97.5 F | WEIGHT: 220 LBS | HEART RATE: 70 BPM

## 2022-10-27 DIAGNOSIS — H11.31 SUBCONJUNCTIVAL HEMORRHAGE OF RIGHT EYE: ICD-10-CM

## 2022-10-27 DIAGNOSIS — F41.8 OTHER SPECIFIED ANXIETY DISORDERS: Primary | ICD-10-CM

## 2022-10-27 PROCEDURE — 99213 OFFICE O/P EST LOW 20 MIN: CPT | Performed by: PHYSICIAN ASSISTANT

## 2022-10-27 RX ORDER — HYDROXYZINE HYDROCHLORIDE 25 MG/1
25 TABLET, FILM COATED ORAL EVERY 6 HOURS PRN
Qty: 20 TABLET | Refills: 0 | Status: SHIPPED | OUTPATIENT
Start: 2022-10-27

## 2022-10-27 NOTE — PROGRESS NOTES
[email or web address] [email or web address]        NAME: Taiwo Tang is a 33 y o  male  : 1995    MRN: [id number]  DATE: 2022  TIME: 2:56 PM    Assessment and Plan   Other specified anxiety disorders [F41 8]  1  Other specified anxiety disorders  hydrOXYzine HCL (ATARAX) 25 mg tablet   2  Subconjunctival hemorrhage of right eye  fluorescein sodium sterile 1 strip ophthalmic strip **ADS Override Pull**         Patient Instructions        Follow up with PCP in 3-5 days  Proceed to  ER if symptoms worsen  Chief Complaint     Chief Complaint   Patient presents with   • Eye Problem     Pt  With broken blood vessel in his right eye that  began after he dropped a large piece of asphalt on himself about 9 days ago  • Anxiety     Pt  Reports having panic attacks more frequently since the head trauma  Also reports forgetting words and feeling unfocused  States he doesn't feel like himself  Light and sounds can bother him  He has some dizziness  Denies nausea  History of Present Illness       27 Pt  With broken blood vessel in his right eye that  began after he dropped a large piece of asphalt on himself about 9 days ago  Denies feeling ready, foreign body sensation, discharge, halos, headaches,     Anxiety Pt  Reports having panic attacks more frequently since the head trauma  Also reports forgetting words and feeling unfocused  States he doesn't feel like himself  Light and sounds can bother him  He has some dizziness  Denies nausea  The patient's mother reports that he was diagnosed with anxiety as a young child and had received counseling however as a teenager it has subsided up until recently  He can not explain what triggers his recent anxiety and panic attacks  He denies any significant events, death in the family or friends, illicit drug use  The patient did quit his job and moving with his girlfriend to [place] and is looking forward to starting a new career    Patient does express forward thinking        Eye Problem   Associated symptoms include eye redness  Pertinent negatives include no eye discharge, fever, nausea, photophobia or vomiting  Anxiety  Patient reports no chest pain, confusion, dizziness, nausea, palpitations, shortness of breath or suicidal ideas  Review of Systems   Review of Systems   Constitutional: Negative for activity change, appetite change, chills, fatigue and fever  HENT: Negative for congestion  Eyes: Positive for redness  Negative for photophobia, pain, discharge and visual disturbance  Respiratory: Negative for cough, chest tightness, shortness of breath and wheezing  Cardiovascular: Negative for chest pain and palpitations  Gastrointestinal: Negative for abdominal pain, diarrhea, nausea and vomiting  Musculoskeletal: Negative for myalgias, neck pain and neck stiffness  Skin: Negative for color change and rash  Neurological: Negative for dizziness, light-headedness and headaches  Psychiatric/Behavioral: Negative for agitation, confusion, hallucinations, self-injury and suicidal ideas  Denies homicidal ideations           Current Medications       Current Outpatient Medications:   •  hydrOXYzine HCL (ATARAX) 25 mg tablet, Take 1 tablet (25 mg total) by mouth every 6 (six) hours as needed for itching No driving or use of heavy machinery while on this medication, Disp: 20 tablet, Rfl: 0    Current Allergies     Allergies as of 10/27/2022 - Reviewed 10/27/2022   Allergen Reaction Noted   • Nuts - food allergy  05/29/2018            The following portions of the patient's history were reviewed and updated as appropriate: allergies, current medications, past family history, past medical history, past social history, past surgical history and problem list      Past Medical History:   Diagnosis Date   • Anxiety    • No known health problems        Past Surgical History:   Procedure Laterality Date   • NO PAST SURGERIES         Family History Problem Relation Age of Onset   • Thyroid disease Mother    • Hypertension Father    • Restless legs syndrome Father    • Stroke Maternal Grandmother    • Cancer Maternal Grandfather    • Cancer Maternal Aunt    • Bipolar disorder Paternal Aunt    • Anxiety disorder Paternal Aunt          Medications have been verified  Objective   /79   Pulse 70   Temp 97 5 °F (36 4 °C)   Resp 18   Ht 6' 1" (1 854 m)   Wt 99 8 kg (220 lb)   SpO2 96%   BMI 29 03 kg/m²        Physical Exam     Physical Exam  Vitals and nursing note reviewed  Constitutional:       General: He is not in acute distress  Appearance: Normal appearance  He is normal weight  He is not ill-appearing  HENT:      Head: Normocephalic and atraumatic  Right Ear: Tympanic membrane, ear canal and external ear normal       Left Ear: Tympanic membrane, ear canal and external ear normal       Nose: Nose normal       Mouth/Throat:      Mouth: Mucous membranes are moist       Pharynx: Oropharynx is clear  No oropharyngeal exudate or posterior oropharyngeal erythema  Eyes:      Extraocular Movements: Extraocular movements intact  Pupils: Pupils are equal, round, and reactive to light  Comments: 10 mm sub conjunctiva hematoma noted at the 1 o'clock position of the right eye  OD 20/30, OS 20/40 w/o correction  Pt wears corrective lenses  Cardiovascular:      Rate and Rhythm: Normal rate  Pulses: Normal pulses  Heart sounds: Normal heart sounds  Pulmonary:      Effort: Pulmonary effort is normal  No respiratory distress  Breath sounds: Normal breath sounds  No wheezing or rhonchi  Musculoskeletal:         General: Normal range of motion  Cervical back: Normal range of motion and neck supple  No tenderness  Lymphadenopathy:      Cervical: No cervical adenopathy  Skin:     General: Skin is warm and dry  Capillary Refill: Capillary refill takes less than 2 seconds        Findings: No lesion or rash    Neurological:      General: No focal deficit present  Mental Status: He is alert and oriented to person, place, and time  Cranial Nerves: No cranial nerve deficit  Coordination: Coordination normal       Gait: Gait normal    Psychiatric:         Mood and Affect: Mood and affect normal  Mood is not anxious  Speech: Speech normal          Behavior: Behavior normal  Behavior is cooperative  Thought Content: Thought content normal          Judgment: Judgment normal        Right eye fluorescein dye exam was unremarkable in a dark room with black light and magnifying lens

## 2022-10-27 NOTE — PATIENT INSTRUCTIONS
Anxiety   WHAT YOU NEED TO KNOW:   Anxiety is a condition that causes you to feel extremely worried or nervous  The feelings are so strong that they can cause problems with your daily activities or sleep  Anxiety may be triggered by something you fear, or it may happen without a cause  Family or work stress, smoking, caffeine, and alcohol can increase your risk for anxiety  Certain medicines or health conditions can also increase your risk  Anxiety can become a long-term condition if it is not managed or treated  DISCHARGE INSTRUCTIONS:   Call your local emergency number (911 in the [address] [facility] [facility]) if:   You have chest pain, tightness, or heaviness that may spread to your shoulders, arms, jaw, neck, or back  You feel like hurting yourself or someone else  Call your doctor if:   Your symptoms get worse or do not get better with treatment  Your anxiety keeps you from doing your regular daily activities  You have new symptoms since your last visit  You have questions or concerns about your condition or care  Medicines:   Medicines  may be given to help you feel more calm and relaxed, and decrease your symptoms  Take your medicine as directed  Contact your healthcare provider if you think your medicine is not helping or if you have side effects  Tell him of her if you are allergic to any medicine  Keep a list of the medicines, vitamins, and herbs you take  Include the amounts, and when and why you take them  Bring the list or the pill bottles to follow-up visits  Carry your medicine list with you in case of an emergency  Manage anxiety:   Talk to someone about your anxiety  Your healthcare provider may suggest counseling  Cognitive behavioral therapy can help you understand and change how you react to events that trigger your symptoms  You might feel more comfortable talking with a friend or family member about your anxiety  Choose someone you know will be supportive and encouraging      Find ways to relax  Activities such as exercise, meditation, or listening to music can help you relax  Spend time with friends, or do things you enjoy  Practice deep breathing  Deep breathing can help you relax when you feel anxious  Focus on taking slow, deep breaths several times a day, or during an anxiety attack  Breathe in through your nose and out through your mouth  Create a regular sleep routine  Regular sleep can help you feel calmer during the day  Go to sleep and wake up at the same times every day  Do not watch television or use the computer right before bed  Your room should be comfortable, dark, and quiet  Eat a variety of healthy foods  Healthy foods include fruits, vegetables, low-fat dairy products, lean meats, fish, whole-grain breads, and cooked beans  Healthy foods can help you feel less anxious and have more energy  Exercise regularly  Exercise can increase your energy level  Exercise may also lift your mood and help you sleep better  Your healthcare provider can help you create an exercise plan  Do not smoke  Nicotine and other chemicals in cigarettes and cigars can increase anxiety  Ask your healthcare provider for information if you currently smoke and need help to quit  E-cigarettes or smokeless tobacco still contain nicotine  Talk to your healthcare provider before you use these products  Do not have caffeine  Caffeine can make your symptoms worse  Do not have foods or drinks that are meant to increase your energy level  Limit or do not drink alcohol  Ask your healthcare provider if alcohol is safe for you  You may not be able to drink alcohol if you take certain anxiety or depression medicines  Limit alcohol to 1 drink per day if you are a woman  Limit alcohol to 2 drinks per day if you are a man  A drink of alcohol is 12 ounces of beer, 5 ounces of wine, or 1½ ounces of liquor  Do not use drugs  Drugs can make your anxiety worse   It can also make anxiety hard to manage  Talk to your healthcare provider if you use drugs and want help to quit  Follow up with your doctor within 2 weeks or as directed:  Write down your questions so you remember to ask them during your visits  © Copyright [employer] 2022 Information is for End User's use only and may not be sold, redistributed or otherwise used for commercial purposes  All illustrations and images included in CareNotes® are the copyrighted property of A D A M , Inc  or Nito Najera   The above information is an  only  It is not intended as medical advice for individual conditions or treatments  Talk to your doctor, nurse or pharmacist before following any medical regimen to see if it is safe and effective for you  Subconjunctival Hemorrhage   WHAT YOU NEED TO KNOW:   A subconjunctival hemorrhage is when blood collects under the conjunctiva in your eye  The conjunctiva is the clear lining that covers the white part of your eye  The blood comes from broken blood vessels under the conjunctiva  DISCHARGE INSTRUCTIONS:   Care for your eye:   Cold or warm compress:  Use a cold pack during the first 24 hours  Ask how often to apply it and for how long each time  After the first 24 hours, apply a warm pack on your eye  Do this 3 times each day for about 10 to 15 minutes each time  Eyedrops: You may need artificial tears to keep your eye moist  Use the drops as directed  Follow up with your healthcare provider or eye specialist as directed:  Write down your questions so you remember to ask them during your visits  Contact your healthcare provider or eye specialist if:   The redness in your eye has not gone away after 3 weeks  You have another subconjunctival hemorrhage  You have subconjunctival hemorrhages in both eyes  You have questions or concerns about your condition or care  Return to the emergency department if:   You have eye pain or sensitivity to light      Your vision changes  You have white or yellow discharge from your eye  © Copyright [employer] 2022 Information is for End User's use only and may not be sold, redistributed or otherwise used for commercial purposes  All illustrations and images included in CareNotes® are the copyrighted property of [employer] M [employer] Inc  or Nito Ballesteros  The above information is an  only  It is not intended as medical advice for individual conditions or treatments  Talk to your doctor, nurse or pharmacist before following any medical regimen to see if it is safe and effective for you

## 2022-11-23 ENCOUNTER — OFFICE VISIT (OUTPATIENT)
Dept: INTERNAL MEDICINE CLINIC | Age: 27
End: 2022-11-23

## 2022-11-23 VITALS
HEIGHT: 73 IN | HEART RATE: 78 BPM | WEIGHT: 215 LBS | TEMPERATURE: 97.7 F | SYSTOLIC BLOOD PRESSURE: 108 MMHG | BODY MASS INDEX: 28.49 KG/M2 | OXYGEN SATURATION: 96 % | DIASTOLIC BLOOD PRESSURE: 54 MMHG

## 2022-11-23 DIAGNOSIS — F41.9 ANXIETY: ICD-10-CM

## 2022-11-23 DIAGNOSIS — Z13.228 SCREENING FOR METABOLIC DISORDER: ICD-10-CM

## 2022-11-23 DIAGNOSIS — R68.2 DRY MOUTH: Primary | ICD-10-CM

## 2022-11-23 DIAGNOSIS — F17.229 CHEWING TOBACCO NICOTINE DEPENDENCE WITH NICOTINE-INDUCED DISORDER: ICD-10-CM

## 2022-11-23 DIAGNOSIS — R35.89 POLYURIA: ICD-10-CM

## 2022-11-23 DIAGNOSIS — R53.83 OTHER FATIGUE: ICD-10-CM

## 2022-11-23 RX ORDER — ESCITALOPRAM OXALATE 10 MG/1
10 TABLET ORAL DAILY
Qty: 30 TABLET | Refills: 1 | Status: SHIPPED | OUTPATIENT
Start: 2022-11-23

## 2022-11-23 NOTE — PROGRESS NOTES
Assessment/Plan:         Diagnoses and all orders for this visit:    Dry mouth  Comments:  increase water intake  check labs  RBS normal   Orders:  -     CBC and differential; Future  -     Comprehensive metabolic panel; Future  -     Lipid panel; Future  -     TSH, 3rd generation; Future  -     Urinalysis with microscopic    Anxiety  Comments:  +lexapro, increase slowly  consider atarax prn  may need short acting anxiolytic but would await labs prior to adding this   Orders:  -     CBC and differential; Future  -     Comprehensive metabolic panel; Future  -     Lipid panel; Future  -     TSH, 3rd generation; Future  -     Urinalysis with microscopic  -     escitalopram (Lexapro) 10 mg tablet; Take 1 tablet (10 mg total) by mouth daily    Other fatigue  -     CBC and differential; Future  -     Comprehensive metabolic panel; Future  -     Lipid panel; Future  -     TSH, 3rd generation; Future  -     Urinalysis with microscopic    Screening for metabolic disorder  -     CBC and differential; Future  -     Comprehensive metabolic panel; Future  -     Lipid panel; Future  -     TSH, 3rd generation; Future  -     Urinalysis with microscopic    Polyuria  -     CBC and differential; Future  -     Comprehensive metabolic panel; Future  -     Lipid panel; Future  -     TSH, 3rd generation; Future  -     Urinalysis with microscopic    Chewing tobacco nicotine dependence with nicotine-induced disorder  Comments:  taper off usage as tolerated        Reviewed testing recommendations  Will go for labs  Does not have insurance  Would recommend MRI brain however pt unable to afford at this time  Consider looking into medicaid coverage  Pt instructed to go to ER if sx worsen     Subjective:      Patient ID: Kemi Romero is a 33 y o  male      32 Y/O male with c/o anxiety, recent panic attacks, difficulty concentrating, fatigue, dry mouth       Pt reports approx 6 weeks ago he was hit in the face with a piece of asphalt and feels like he can't focus since then  Concerned for concussion   Hit him in face when he was throwing large piece   Denies vision changes   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       Pt describes a panic attack occurring a few weeks ago when he was driving  Pt notes heart pounding, sob, felt like I was going to pass out, pt called ambulance and had to stop his car and wait on the side of the road  Pt went to urgent care 10/26/22 for the same and was given hydroxyzine - pt took a dose of this but had a headache so he stopped     Pt states after playing video games his head feels not as clear     Pt states he has been feeling tightness in chest and nose congestion - has been taking Cetirizine for this reason     He states he gets freaked out sometimes thinking about his concerns   Pt states he feels like he is dreaming and having trouble concentrating   "I feel like one side of me is saying there is something wrong"      Pt chews tobacco for years but has cut down on this lately due to the way he was feeling   Cut out caffeine   Denies smoking  Denies etoh use             The following portions of the patient's history were reviewed and updated as appropriate: allergies, current medications, past family history, past medical history, past social history, past surgical history and problem list     Review of Systems   Constitutional: Positive for appetite change (doesn't feel as hungry )  Negative for chills, diaphoresis, fatigue and fever  HENT: Positive for congestion  Negative for postnasal drip and sore throat  Eyes: Negative for pain, redness and visual disturbance  Respiratory: Negative for cough, shortness of breath and wheezing  Cardiovascular: Negative for chest pain and leg swelling  Gastrointestinal: Negative for abdominal pain, constipation, diarrhea and nausea  Genitourinary: Positive for frequency  Negative for hematuria     Musculoskeletal: Negative for arthralgias, back pain and gait problem  Skin: Negative for rash  Allergic/Immunologic: Positive for environmental allergies  Neurological: Negative for dizziness, light-headedness and headaches  Hematological: Does not bruise/bleed easily  Psychiatric/Behavioral: Positive for decreased concentration and sleep disturbance  The patient is nervous/anxious  Past Medical History:   Diagnosis Date   • Anxiety    • No known health problems          Current Outpatient Medications:   •  escitalopram (Lexapro) 10 mg tablet, Take 1 tablet (10 mg total) by mouth daily, Disp: 30 tablet, Rfl: 1    Allergies   Allergen Reactions   • Nuts - Food Allergy      Almonds       Social History   Past Surgical History:   Procedure Laterality Date   • NO PAST SURGERIES       Family History   Problem Relation Age of Onset   • Thyroid disease Mother    • Hypertension Father    • Restless legs syndrome Father    • Stroke Maternal Grandmother    • Cancer Maternal Grandfather    • Cancer Maternal Aunt    • Bipolar disorder Paternal Aunt    • Anxiety disorder Paternal Aunt        Objective:  /54 (BP Location: Left arm, Patient Position: Sitting, Cuff Size: Large)   Pulse 78   Temp 97 7 °F (36 5 °C) (Temporal)   Ht 6' 1" (1 854 m)   Wt 97 5 kg (215 lb)   SpO2 96%   BMI 28 37 kg/m²        Physical Exam  Vitals reviewed  Constitutional:       General: He is not in acute distress  HENT:      Head: Normocephalic and atraumatic  Right Ear: Tympanic membrane, ear canal and external ear normal  There is no impacted cerumen  Left Ear: Tympanic membrane, ear canal and external ear normal  There is no impacted cerumen  Nose: Nose normal       Mouth/Throat:      Mouth: Mucous membranes are dry  Pharynx: No oropharyngeal exudate or posterior oropharyngeal erythema  Eyes:      General:         Right eye: No discharge  Left eye: No discharge        Conjunctiva/sclera: Conjunctivae normal       Pupils: Pupils are equal, round, and reactive to light  Cardiovascular:      Rate and Rhythm: Normal rate and regular rhythm  Pulmonary:      Effort: Pulmonary effort is normal  No respiratory distress  Breath sounds: Normal breath sounds  No wheezing, rhonchi or rales  Abdominal:      General: Bowel sounds are normal  There is no distension  Musculoskeletal:         General: No tenderness  Normal range of motion  Cervical back: Normal range of motion  Right lower leg: No edema  Left lower leg: No edema  Lymphadenopathy:      Cervical: No cervical adenopathy  Skin:     General: Skin is warm  Findings: No bruising or rash  Neurological:      General: No focal deficit present  Mental Status: He is alert and oriented to person, place, and time     Psychiatric:         Mood and Affect: Mood normal          Behavior: Behavior normal

## 2022-11-30 ENCOUNTER — TELEPHONE (OUTPATIENT)
Dept: INTERNAL MEDICINE CLINIC | Age: 27
End: 2022-11-30

## 2022-11-30 NOTE — TELEPHONE ENCOUNTER
Pt mom called the office and stated that pt started lexapro today and has seen the psychiatrist but they suggest that you suggested a medication be prescribed until the lexapro fully kicks in  Psychiatrist said it can be a low dose of  5mg, he suggested xanax and ativan  The psychiatrist [phone] Jaylen Green--personal cell, said you may call him if you have any questions

## 2022-12-01 NOTE — TELEPHONE ENCOUNTER
Spoke to patient's mom Hernán Combs, she understood that patient needs this blood work done before any additional medication is given

## 2022-12-14 ENCOUNTER — TELEPHONE (OUTPATIENT)
Dept: INTERNAL MEDICINE CLINIC | Age: 27
End: 2022-12-14

## 2022-12-14 ENCOUNTER — APPOINTMENT (OUTPATIENT)
Dept: LAB | Age: 27
End: 2022-12-14

## 2022-12-14 DIAGNOSIS — R68.2 DRY MOUTH: ICD-10-CM

## 2022-12-14 DIAGNOSIS — Z13.228 SCREENING FOR METABOLIC DISORDER: ICD-10-CM

## 2022-12-14 DIAGNOSIS — R53.83 OTHER FATIGUE: ICD-10-CM

## 2022-12-14 DIAGNOSIS — R35.89 POLYURIA: ICD-10-CM

## 2022-12-14 DIAGNOSIS — F41.9 ANXIETY: ICD-10-CM

## 2022-12-14 LAB
ALBUMIN SERPL BCP-MCNC: 4.3 G/DL (ref 3.5–5)
ALP SERPL-CCNC: 57 U/L (ref 46–116)
ALT SERPL W P-5'-P-CCNC: 24 U/L (ref 12–78)
ANION GAP SERPL CALCULATED.3IONS-SCNC: 2 MMOL/L (ref 4–13)
AST SERPL W P-5'-P-CCNC: 9 U/L (ref 5–45)
BACTERIA UR QL AUTO: ABNORMAL /HPF
BASOPHILS # BLD AUTO: 0.06 THOUSANDS/ÂΜL (ref 0–0.1)
BASOPHILS NFR BLD AUTO: 1 % (ref 0–1)
BILIRUB SERPL-MCNC: 1.11 MG/DL (ref 0.2–1)
BILIRUB UR QL STRIP: NEGATIVE
BUN SERPL-MCNC: 9 MG/DL (ref 5–25)
CALCIUM SERPL-MCNC: 9.7 MG/DL (ref 8.3–10.1)
CHLORIDE SERPL-SCNC: 106 MMOL/L (ref 96–108)
CHOLEST SERPL-MCNC: 188 MG/DL
CLARITY UR: CLEAR
CO2 SERPL-SCNC: 31 MMOL/L (ref 21–32)
COLOR UR: ABNORMAL
CREAT SERPL-MCNC: 0.97 MG/DL (ref 0.6–1.3)
EOSINOPHIL # BLD AUTO: 0.16 THOUSAND/ÂΜL (ref 0–0.61)
EOSINOPHIL NFR BLD AUTO: 3 % (ref 0–6)
ERYTHROCYTE [DISTWIDTH] IN BLOOD BY AUTOMATED COUNT: 12.5 % (ref 11.6–15.1)
GFR SERPL CREATININE-BSD FRML MDRD: 107 ML/MIN/1.73SQ M
GLUCOSE P FAST SERPL-MCNC: 102 MG/DL (ref 65–99)
GLUCOSE UR STRIP-MCNC: NEGATIVE MG/DL
HCT VFR BLD AUTO: 44.1 % (ref 36.5–49.3)
HDLC SERPL-MCNC: 29 MG/DL
HGB BLD-MCNC: 14.6 G/DL (ref 12–17)
HGB UR QL STRIP.AUTO: NEGATIVE
IMM GRANULOCYTES # BLD AUTO: 0.01 THOUSAND/UL (ref 0–0.2)
IMM GRANULOCYTES NFR BLD AUTO: 0 % (ref 0–2)
KETONES UR STRIP-MCNC: NEGATIVE MG/DL
LDLC SERPL CALC-MCNC: 127 MG/DL (ref 0–100)
LEUKOCYTE ESTERASE UR QL STRIP: NEGATIVE
LYMPHOCYTES # BLD AUTO: 2.03 THOUSANDS/ÂΜL (ref 0.6–4.47)
LYMPHOCYTES NFR BLD AUTO: 34 % (ref 14–44)
MCH RBC QN AUTO: 29.2 PG (ref 26.8–34.3)
MCHC RBC AUTO-ENTMCNC: 33.1 G/DL (ref 31.4–37.4)
MCV RBC AUTO: 88 FL (ref 82–98)
MONOCYTES # BLD AUTO: 0.47 THOUSAND/ÂΜL (ref 0.17–1.22)
MONOCYTES NFR BLD AUTO: 8 % (ref 4–12)
MUCOUS THREADS UR QL AUTO: ABNORMAL
NEUTROPHILS # BLD AUTO: 3.24 THOUSANDS/ÂΜL (ref 1.85–7.62)
NEUTS SEG NFR BLD AUTO: 54 % (ref 43–75)
NITRITE UR QL STRIP: NEGATIVE
NON-SQ EPI CELLS URNS QL MICRO: ABNORMAL /HPF
NONHDLC SERPL-MCNC: 159 MG/DL
NRBC BLD AUTO-RTO: 0 /100 WBCS
PH UR STRIP.AUTO: 7 [PH]
PLATELET # BLD AUTO: 294 THOUSANDS/UL (ref 149–390)
PMV BLD AUTO: 10.7 FL (ref 8.9–12.7)
POTASSIUM SERPL-SCNC: 3.9 MMOL/L (ref 3.5–5.3)
PROT SERPL-MCNC: 7.8 G/DL (ref 6.4–8.4)
PROT UR STRIP-MCNC: ABNORMAL MG/DL
RBC # BLD AUTO: 5 MILLION/UL (ref 3.88–5.62)
RBC #/AREA URNS AUTO: ABNORMAL /HPF
SODIUM SERPL-SCNC: 139 MMOL/L (ref 135–147)
SP GR UR STRIP.AUTO: 1.02 (ref 1–1.03)
TRIGL SERPL-MCNC: 160 MG/DL
TSH SERPL DL<=0.05 MIU/L-ACNC: 2.11 UIU/ML (ref 0.45–4.5)
UROBILINOGEN UR STRIP-ACNC: <2 MG/DL
WBC # BLD AUTO: 5.97 THOUSAND/UL (ref 4.31–10.16)
WBC #/AREA URNS AUTO: ABNORMAL /HPF

## 2022-12-14 NOTE — TELEPHONE ENCOUNTER
Patient's mother is calling to let you know that the blood work was done today        If you can look at it and then decide on what medication to have the patient on and sent to the pharmacy    [facility] in [facility]    Please the son back on his mobile phone

## 2022-12-15 DIAGNOSIS — F41.0 PANIC ATTACK: Primary | ICD-10-CM

## 2022-12-15 RX ORDER — LORAZEPAM 0.5 MG/1
0.5 TABLET ORAL
Qty: 15 TABLET | Refills: 0 | Status: SHIPPED | OUTPATIENT
Start: 2022-12-15

## 2022-12-15 NOTE — TELEPHONE ENCOUNTER
Spoke with pt, reviewed results  He is going to CBT  Never started lexapro due to anxiety over meds  Requesting med for panic attacks, therapist suggested valium and pt has used this in past (was prescribed previously)  Discussed risks associated with benzos and dependancy issues  Pt understands, will use only at bedtime if unable to sleep otherwise and I also encouraged him to start the lexapro tonight   Will order 15 tabs and f/u in 2 weeks in office   Pdmp reviewed

## 2022-12-16 ENCOUNTER — APPOINTMENT (EMERGENCY)
Dept: CT IMAGING | Facility: HOSPITAL | Age: 27
End: 2022-12-16

## 2022-12-16 ENCOUNTER — HOSPITAL ENCOUNTER (EMERGENCY)
Facility: HOSPITAL | Age: 27
Discharge: HOME | End: 2022-12-16
Attending: EMERGENCY MEDICINE

## 2022-12-16 VITALS
HEART RATE: 72 BPM | WEIGHT: 225 LBS | BODY MASS INDEX: 29.69 KG/M2 | OXYGEN SATURATION: 96 % | TEMPERATURE: 98.2 F | SYSTOLIC BLOOD PRESSURE: 127 MMHG | DIASTOLIC BLOOD PRESSURE: 81 MMHG | RESPIRATION RATE: 18 BRPM

## 2022-12-16 DIAGNOSIS — F41.9 ANXIETY: Primary | ICD-10-CM

## 2022-12-17 NOTE — ED ATTENDING ATTESTATION
12/16/2022  IAdriana DO, saw and evaluated the patient  I have discussed the patient with the resident/non-physician practitioner and agree with the resident's/non-physician practitioner's findings, Plan of Care, and MDM as documented in the resident's/non-physician practitioner's note, except where noted  All available labs and Radiology studies were reviewed  I was present for key portions of any procedure(s) performed by the resident/non-physician practitioner and I was immediately available to provide assistance  At this point I agree with the current assessment done in the Emergency Department  I have conducted an independent evaluation of this patient a history and physical is as follows:    Patient is a 50-year-old male with a history of anxiety who presents with worsening anxiety, photophobia and shortness of breath  Patient states that he was struck in the face with concrete while at work several weeks ago  He states that since this injury he has had worsening anxiety  He also describes photophobia, fatigue and loss of interest in activities  He was seen by his PCP and had blood work done several days ago  His PCP recommended Lexapro however patient has a fear of taking medications  He tried a benzodiazepine once or twice but does not wish to take that consistently  He was referred for cognitive behavioral therapy  However his doctor told him to present to the ED if his symptoms worsened  He denies any numbness, tingling, weakness, speech difficulty or other concerns  Exam, patient is in no acute distress  Heart is regular rate and rhythm  Breath sounds normal   Abdomen is soft, nontender, nondistended  No rebound or guarding  Cranial nerves II through XII grossly intact  Motor, sensation normal   Cerebellar exam normal     CT head unremarkable  Possible postconcussion syndrome versus primary psychiatric issue  No indication for inpatient psychiatric treatment  Patient will continue outpatient follow-up and return to ED if symptoms worsen  He is well-appearing and stable for discharge  Portions of the above record have been created with voice recognition software  Occasional wrong word or "sound alike" substitutions may have occurred due to the inherent limitations of voice recognition software  Read the chart carefully and recognize, using context, where substitutions may have occurred        ED Course         Critical Care Time  Procedures

## 2022-12-17 NOTE — DISCHARGE INSTRUCTIONS
Please continue to see you psychiatrist, go to CBT and follow up with PCP for symptomatic management  Thank you for allowing us to take part in your care

## 2022-12-17 NOTE — ED PROVIDER NOTES
History  Chief Complaint   Patient presents with   • Headache     Pt states approx 1 month ago to have been hit in head with concrete while at work  Pt reports since incident, to have been having pressure in head, photosensitivity and "panic attacks"     Patient is a 62-year-old male presenting 1 month after head injury with increased anxiety and frequent panic attacks  Patient states 1 month ago he was at work when he was hit in the face by a concrete block for which he did not see a doctor afterwards until a couple weeks later when he started having symptoms of increased anxiety, photosensitivity, frequent panic attacks  Patient states he used to have anxiety and panic attacks prior to the injury but they are much more frequent now happening every day and sometimes multiple times a day  Since the injury patient does not really do anything, has not returned to work, is afraid to drive, and is scared of taking medications  Patient states he has been prescribed Ativan and Lexapro but when he takes these he gets anxiety and has a panic attack due to the effects of the medication and now he is afraid of taking the medication  Between panic attack episodes he still feels off but cannot describe what he is feeling  Patient states he has has seen his PCP and psychiatry for his symptoms but due to the fact that he is afraid of taking medication there has been no improvement  Patient states at one point his PCP told him that if symptoms persist he should get an MRI of his head  Patient endorses frequent shortness of breath where he feels like he cannot breathe or take a deep breath but denies headaches, dizziness, changes in vision, chest pain, shortness, weakness, trouble speaking, numbness or tingling in his extremities  Prior to Admission Medications   Prescriptions Last Dose Informant Patient Reported? Taking?    LORazepam (Ativan) 0 5 mg tablet   No No   Sig: Take 1 tablet (0 5 mg total) by mouth daily at bedtime as needed for anxiety   escitalopram (Lexapro) 10 mg tablet   No No   Sig: Take 1 tablet (10 mg total) by mouth daily      Facility-Administered Medications: None       Past Medical History:   Diagnosis Date   • Anxiety    • No known health problems        Past Surgical History:   Procedure Laterality Date   • NO PAST SURGERIES         Family History   Problem Relation Age of Onset   • Thyroid disease Mother    • Hypertension Father    • Restless legs syndrome Father    • Stroke Maternal Grandmother    • Cancer Maternal Grandfather    • Cancer Maternal Aunt    • Bipolar disorder Paternal Aunt    • Anxiety disorder Paternal Aunt      I have reviewed and agree with the history as documented  E-Cigarette/Vaping     E-Cigarette/Vaping Substances     Social History     Tobacco Use   • Smoking status: Never   • Smokeless tobacco: Current     Types: Chew   Substance Use Topics   • Alcohol use: Yes     Comment: rare   • Drug use: No        Review of Systems    Physical Exam  ED Triage Vitals [12/16/22 1837]   Temperature Pulse Respirations Blood Pressure SpO2   98 2 °F (36 8 °C) 77 18 132/75 97 %      Temp Source Heart Rate Source Patient Position - Orthostatic VS BP Location FiO2 (%)   Oral Monitor -- Right arm --      Pain Score       --             Orthostatic Vital Signs  Vitals:    12/16/22 1837   BP: 132/75   Pulse: 77       Physical Exam    ED Medications  Medications - No data to display    Diagnostic Studies  Results Reviewed     None                 No orders to display         Procedures  Procedures      ED Course                                       MDM    Disposition  Final diagnoses:   None     ED Disposition     None      Follow-up Information    None         Patient's Medications   Discharge Prescriptions    No medications on file     No discharge procedures on file  PDMP Review     None           ED Provider  Attending physically available and evaluated Prashant Carvalho I managed the patient along with the ED Attending      Electronically Signed by or rash  Neurological:      General: No focal deficit present  Mental Status: He is alert and oriented to person, place, and time  Cranial Nerves: No cranial nerve deficit  Sensory: No sensory deficit  Motor: No weakness  Coordination: Coordination normal          ED Medications  Medications - No data to display    Diagnostic Studies  Results Reviewed     None                 CT head without contrast   Final Result by Suzette Brownlee MD (12/16 2126)      No acute intracranial abnormality  Workstation performed: [id number]               Procedures  Procedures      ED Course                                       MDM  Number of Diagnoses or Management Options  Anxiety  Diagnosis management comments: 33 yoM presenting with frequent panic attacks after head trauma 2 month ago  Pt is concerned for intracranial process  CT head- no acute intracranial abnormalities  Pt is concerned about side effects of medications causing him anxiety about taking medications that he has been prescribed  Risks and benefits of medications discussed with pt and encourage to continue to follow up with PCP and psychiatry for treatment since there does no seem to be any intracranial emergencies present at this time  Pt also states he is schedules for CBT which pt was encouraged to attend  Pt denies any hx or current SI/HI  Pt is agreeable and appropriate for discharge  Disposition  Final diagnoses:   Anxiety     Time reflects when diagnosis was documented in both MDM as applicable and the Disposition within this note     Time User Action Codes Description Comment    12/16/2022 10:23 PM Sophia Arauz Add [F41 9] Anxiety       ED Disposition     ED Disposition   Discharge    Condition   Stable    Date/Time   Fri Dec 16, 2022 10:23 PM    Mera Castro discharge to home/self care                 Follow-up Information    None         Discharge Medication List as of 12/16/2022 10:24 PM      CONTINUE these medications which have NOT CHANGED    Details   escitalopram (Lexapro) 10 mg tablet Take 1 tablet (10 mg total) by mouth daily, Starting Wed 11/23/2022, Normal      LORazepam (Ativan) 0 5 mg tablet Take 1 tablet (0 5 mg total) by mouth daily at bedtime as needed for anxiety, Starting Thu 12/15/2022, Normal           No discharge procedures on file  PDMP Review     None           ED Provider  Attending physically available and evaluated Bryant Irwin I managed the patient along with the ED Attending      Electronically Signed by         Drew Lares MD  12/21/22 4656

## 2022-12-28 ENCOUNTER — OFFICE VISIT (OUTPATIENT)
Dept: INTERNAL MEDICINE CLINIC | Age: 27
End: 2022-12-28

## 2022-12-28 VITALS
HEART RATE: 79 BPM | OXYGEN SATURATION: 96 % | TEMPERATURE: 98.7 F | BODY MASS INDEX: 28.23 KG/M2 | HEIGHT: 73 IN | DIASTOLIC BLOOD PRESSURE: 68 MMHG | WEIGHT: 213 LBS | SYSTOLIC BLOOD PRESSURE: 114 MMHG

## 2022-12-28 DIAGNOSIS — F40.10 SOCIAL ANXIETY DISORDER: ICD-10-CM

## 2022-12-28 DIAGNOSIS — F41.1 GAD (GENERALIZED ANXIETY DISORDER): Primary | ICD-10-CM

## 2022-12-28 NOTE — PROGRESS NOTES
Assessment/Plan:         Diagnoses and all orders for this visit:    FUENTES (generalized anxiety disorder)  Comments:  discussed use of lorazepam, pt understands to use prn only  will call when refill needed  continue lexapro 10mg daily, continue weekly counseling sessions    Social anxiety disorder          Subjective:      Patient ID: Noemi Casas is a 33 y o  male  34 y/o male with hx of anxiety, social anxiety   Pt reports he has been able to start the lexapro and has been taking this for approx  1 week and tolerates well   Pt reports his sleeping has been better as well  He has needed to take ativan 6 times since it was prescribed  He feels some improvement of anxiety sx so far  He has been trying to get out of the house and do more lately  Pt went to ER 12/16 for anxiety and had CT head which was negative    Labs and CT head reviewed    Pt continues with therapy once weekly       The following portions of the patient's history were reviewed and updated as appropriate: allergies, current medications, past family history, past medical history, past social history, past surgical history and problem list     Review of Systems   Constitutional: Positive for appetite change  Negative for activity change, chills, fatigue and fever  HENT: Negative for congestion and sore throat  Eyes: Negative for pain and redness  Respiratory: Negative for cough, shortness of breath and wheezing  Cardiovascular: Negative for chest pain and leg swelling  Gastrointestinal: Negative for abdominal pain, constipation, diarrhea and nausea  Musculoskeletal: Negative for arthralgias, back pain and gait problem  Skin: Negative for rash  Neurological: Negative for dizziness, speech difficulty, weakness, light-headedness and headaches  Psychiatric/Behavioral: Negative for sleep disturbance  The patient is nervous/anxious            Past Medical History:   Diagnosis Date   • Anxiety    • No known health problems Current Outpatient Medications:   •  escitalopram (Lexapro) 10 mg tablet, Take 1 tablet (10 mg total) by mouth daily, Disp: 30 tablet, Rfl: 1  •  LORazepam (Ativan) 0 5 mg tablet, Take 1 tablet (0 5 mg total) by mouth daily at bedtime as needed for anxiety, Disp: 15 tablet, Rfl: 0    Allergies   Allergen Reactions   • Nuts - Food Allergy      Almonds       Social History   Past Surgical History:   Procedure Laterality Date   • NO PAST SURGERIES       Family History   Problem Relation Age of Onset   • Thyroid disease Mother    • Hypertension Father    • Restless legs syndrome Father    • Stroke Maternal Grandmother    • Cancer Maternal Grandfather    • Cancer Maternal Aunt    • Bipolar disorder Paternal Aunt    • Anxiety disorder Paternal Aunt        Objective:  /68 (BP Location: Left arm, Patient Position: Sitting, Cuff Size: Large)   Pulse 79   Temp 98 7 °F (37 1 °C) (Temporal)   Ht 6' 1" (1 854 m)   Wt 96 6 kg (213 lb)   SpO2 96%   BMI 28 10 kg/m²        Physical Exam  Vitals reviewed  Constitutional:       General: He is not in acute distress  HENT:      Head: Normocephalic and atraumatic  Eyes:      Extraocular Movements: Extraocular movements intact  Pupils: Pupils are equal, round, and reactive to light  Cardiovascular:      Rate and Rhythm: Normal rate and regular rhythm  Pulmonary:      Effort: Pulmonary effort is normal  No respiratory distress  Breath sounds: No wheezing or rales  Skin:     Findings: No rash  Neurological:      General: No focal deficit present  Mental Status: He is alert and oriented to person, place, and time     Psychiatric:         Mood and Affect: Mood normal

## 2023-01-12 ENCOUNTER — TELEPHONE (OUTPATIENT)
Dept: INTERNAL MEDICINE CLINIC | Age: 28
End: 2023-01-12

## 2023-01-12 DIAGNOSIS — F41.9 ANXIETY: ICD-10-CM

## 2023-01-12 RX ORDER — ESCITALOPRAM OXALATE 10 MG/1
15 TABLET ORAL DAILY
Qty: 45 TABLET | Refills: 1 | Status: SHIPPED | OUTPATIENT
Start: 2023-01-12

## 2023-01-12 NOTE — TELEPHONE ENCOUNTER
Please return patient call, we can only increase this medication slowly, every 4-6 weeks, I believe he has been on the current dose for about 3 weeks, please confirm with him, we can increase but would not do so rapidly

## 2023-01-12 NOTE — TELEPHONE ENCOUNTER
Patient states he has 5 days left  He will start 15 mg dose on Wednesday 1/18/23   Refill sent to pharmacy

## 2023-01-12 NOTE — TELEPHONE ENCOUNTER
Pt called the office and needs a refill on his lexapro  He said his therapist told him it should be upped to 15 or 20mg  Told him it is working but to be more effective for him it should be a little of a higher dose

## 2023-01-12 NOTE — TELEPHONE ENCOUNTER
He may continue until current script is done (10mg daily) then may increase to 1 5 tabs daily (15mg) and f/u in 4 weeks

## 2023-01-12 NOTE — TELEPHONE ENCOUNTER
Patient states he has 5 days left of medication  Patient states he thinks he is on day 24 of medication  Patient will need refill    Please advise

## 2023-02-01 ENCOUNTER — OFFICE VISIT (OUTPATIENT)
Dept: INTERNAL MEDICINE CLINIC | Age: 28
End: 2023-02-01

## 2023-02-01 VITALS
BODY MASS INDEX: 29.03 KG/M2 | WEIGHT: 219 LBS | TEMPERATURE: 99.1 F | OXYGEN SATURATION: 97 % | HEART RATE: 84 BPM | DIASTOLIC BLOOD PRESSURE: 78 MMHG | HEIGHT: 73 IN | SYSTOLIC BLOOD PRESSURE: 116 MMHG

## 2023-02-01 DIAGNOSIS — F41.9 ANXIETY: Primary | ICD-10-CM

## 2023-02-01 DIAGNOSIS — H04.123 DRY EYES: ICD-10-CM

## 2023-02-01 NOTE — PROGRESS NOTES
Assessment/Plan:         Diagnoses and all orders for this visit:    Anxiety  Comments:  increase escitalpram to 15mg daily  f/u in 6 weeks  continue counseling   recommend daily aerobic exercise    Dry eyes  Comments:  +systane eye drops  if persists, consider eye exam           Subjective:      Patient ID: Rola Garcia is a 33 y o  male  34 y/o male with hx of FUENTES, panic disorder  Feels better with Lexapro and has not had any panic attacks  Pt continues with seeing his therapist every 2 weeks  He has been going out and feeling more comfortable with this  Pt states when the sun is very bright it increases her anxiety     Pt reports occasional episodes of blurriness in R eye - wears glasses, states he had an eye exam over the summer  Pt denies h/a but states he does get a pain at times that feels like a "jolt" that then resolved  Pt does not wear contacts   Pt had CT head in ER 12/16/22 (negative)      The following portions of the patient's history were reviewed and updated as appropriate: allergies, current medications, past family history, past medical history, past social history, past surgical history and problem list     Review of Systems   Constitutional: Negative for activity change, appetite change, chills, diaphoresis, fatigue and fever  HENT: Negative for congestion and sore throat  Eyes: Positive for visual disturbance  Negative for redness  Respiratory: Negative for cough, shortness of breath and wheezing  Cardiovascular: Negative for chest pain and leg swelling  Gastrointestinal: Negative for abdominal pain, constipation, diarrhea and nausea  Musculoskeletal: Negative for arthralgias, back pain and gait problem  Skin: Negative for rash  Neurological: Negative for dizziness, light-headedness and headaches  Hematological: Does not bruise/bleed easily  Psychiatric/Behavioral: Negative for sleep disturbance  The patient is nervous/anxious            Past Medical History: Diagnosis Date   • Anxiety    • No known health problems          Current Outpatient Medications:   •  escitalopram (Lexapro) 10 mg tablet, Take 1 5 tablets (15 mg total) by mouth daily (Patient taking differently: Take 10 mg by mouth daily), Disp: 45 tablet, Rfl: 1  •  LORazepam (Ativan) 0 5 mg tablet, Take 1 tablet (0 5 mg total) by mouth daily at bedtime as needed for anxiety, Disp: 15 tablet, Rfl: 0    Allergies   Allergen Reactions   • Nuts - Food Allergy      Almonds       Social History   Past Surgical History:   Procedure Laterality Date   • NO PAST SURGERIES       Family History   Problem Relation Age of Onset   • Thyroid disease Mother    • Hypertension Father    • Restless legs syndrome Father    • Stroke Maternal Grandmother    • Cancer Maternal Grandfather    • Cancer Maternal Aunt    • Bipolar disorder Paternal Aunt    • Anxiety disorder Paternal Aunt        Objective:  /78 (BP Location: Left arm, Patient Position: Sitting, Cuff Size: Large)   Pulse 84   Temp 99 1 °F (37 3 °C) (Temporal)   Ht 6' 1" (1 854 m)   Wt 99 3 kg (219 lb)   SpO2 97% Comment: room air  BMI 28 89 kg/m²        Physical Exam  Vitals reviewed  Constitutional:       General: He is not in acute distress  HENT:      Head: Normocephalic and atraumatic  Right Ear: Tympanic membrane, ear canal and external ear normal  There is no impacted cerumen  Left Ear: Tympanic membrane, ear canal and external ear normal  There is no impacted cerumen  Nose: Nose normal  No congestion  Mouth/Throat:      Mouth: Mucous membranes are moist    Eyes:      General:         Right eye: No discharge  Left eye: No discharge  Extraocular Movements: Extraocular movements intact  Conjunctiva/sclera: Conjunctivae normal       Pupils: Pupils are equal, round, and reactive to light        Comments: No conjunctival injection   No drainage present    Cardiovascular:      Rate and Rhythm: Normal rate and regular rhythm  Pulmonary:      Effort: Pulmonary effort is normal  No respiratory distress  Breath sounds: Normal breath sounds  No wheezing or rales  Musculoskeletal:         General: Normal range of motion  Cervical back: Normal range of motion  Right lower leg: No edema  Left lower leg: No edema  Lymphadenopathy:      Cervical: No cervical adenopathy  Skin:     General: Skin is warm  Findings: No erythema or rash  Neurological:      General: No focal deficit present  Mental Status: He is alert and oriented to person, place, and time

## 2023-02-23 DIAGNOSIS — F41.9 ANXIETY: ICD-10-CM

## 2023-02-23 RX ORDER — ESCITALOPRAM OXALATE 10 MG/1
15 TABLET ORAL DAILY
Qty: 45 TABLET | Refills: 1 | Status: SHIPPED | OUTPATIENT
Start: 2023-02-23

## 2023-02-23 NOTE — TELEPHONE ENCOUNTER
LA:  2-1-23   NA: 4-5-23     Patient informed me that he will be moving to [place] next week  and will need his medication until he finds a new doctor in [place]

## 2023-05-16 DIAGNOSIS — F41.9 ANXIETY: ICD-10-CM

## 2023-05-16 RX ORDER — ESCITALOPRAM OXALATE 10 MG/1
15 TABLET ORAL DAILY
Qty: 45 TABLET | Refills: 1 | Status: SHIPPED | OUTPATIENT
Start: 2023-05-16

## 2023-06-21 ENCOUNTER — TELEMEDICINE (OUTPATIENT)
Dept: INTERNAL MEDICINE CLINIC | Age: 28
End: 2023-06-21
Payer: COMMERCIAL

## 2023-06-21 DIAGNOSIS — F41.9 ANXIETY: Primary | ICD-10-CM

## 2023-06-21 PROCEDURE — 99213 OFFICE O/P EST LOW 20 MIN: CPT | Performed by: PHYSICIAN ASSISTANT

## 2023-06-21 RX ORDER — ESCITALOPRAM OXALATE 10 MG/1
15 TABLET ORAL DAILY
Qty: 45 TABLET | Refills: 2 | Status: SHIPPED | OUTPATIENT
Start: 2023-06-21

## 2023-06-21 NOTE — PROGRESS NOTES
Virtual Regular Visit    Verification of patient location:    Patient is located at Home in the following state in which I hold an active license [place]      Assessment/Plan:    Problem List Items Addressed This Visit        Other    Anxiety - Primary    Relevant Medications    escitalopram (Lexapro) 10 mg tablet   continue escitalopram at current dose  Refills ordered           Reason for visit is   Chief Complaint   Patient presents with   • Virtual Brief Visit     Virtual 3 month follow up [phone]   BMI follow up NEEDED   • Virtual Regular Visit        Encounter provider Edilson Groves PA-C    Provider located at [address] [id number]      Recent Visits  No visits were found meeting these conditions  Showing recent visits within past 7 days and meeting all other requirements  Today's Visits  Date Type Provider Dept   06/21/23 Telemedicine Edilson Groves PA-C [facility]   Showing today's visits and meeting all other requirements  Future Appointments  No visits were found meeting these conditions  Showing future appointments within next 150 days and meeting all other requirements       The patient was identified by name and date of birth  Rod Pete was informed that this is a telemedicine visit and that the visit is being conducted through the [employer]  He agrees to proceed     My office door was closed  No one else was in the room  He acknowledged consent and understanding of privacy and security of the video platform  The patient has agreed to participate and understands they can discontinue the visit at any time  Patient is aware this is a billable service  Subjective  Rod Pete is a 33 y o  male        34 y/o male with hx of anxiety d/o presents for f/u, pt tolerating Escitalopram well and states he is sleeping well  Pt states his appetite has been normal    Reports he has not been doing much exercise outside of work but gets 16,000 steps / day   Pt has been feeling well, recently moved in with his SO and reports this has been going well    Sustained injury / burn to hand in May, now resolved        Past Medical History:   Diagnosis Date   • Anxiety    • No known health problems        Past Surgical History:   Procedure Laterality Date   • NO PAST SURGERIES         Current Outpatient Medications   Medication Sig Dispense Refill   • escitalopram (Lexapro) 10 mg tablet Take 1 5 tablets (15 mg total) by mouth daily 45 tablet 2   • LORazepam (Ativan) 0 5 mg tablet Take 1 tablet (0 5 mg total) by mouth daily at bedtime as needed for anxiety (Patient not taking: Reported on 6/21/2023) 15 tablet 0     No current facility-administered medications for this visit  Allergies   Allergen Reactions   • Nuts - Food Allergy      Almonds       Review of Systems   Constitutional: Negative for activity change, appetite change, chills, diaphoresis, fatigue and fever  HENT: Negative for congestion and sore throat  Eyes: Negative for pain and redness  Respiratory: Negative for cough, shortness of breath and wheezing  Cardiovascular: Negative for chest pain and leg swelling  Gastrointestinal: Negative for abdominal pain, constipation, diarrhea and nausea  Musculoskeletal: Negative for arthralgias and back pain  Skin: Negative for rash  Neurological: Negative for dizziness and headaches  Psychiatric/Behavioral: Negative for sleep disturbance and suicidal ideas  The patient is not nervous/anxious  Video Exam    There were no vitals filed for this visit  Physical Exam  Constitutional:       General: He is not in acute distress  HENT:      Head: Normocephalic and atraumatic  Pulmonary:      Effort: Pulmonary effort is normal  No respiratory distress  Neurological:      General: No focal deficit present        Mental Status: He is alert and oriented to person, place, and time     Psychiatric:         Mood and Affect: Mood normal           Visit Time  Total Visit Duration: 15

## 2023-11-16 DIAGNOSIS — F41.9 ANXIETY: ICD-10-CM

## 2023-11-16 RX ORDER — ESCITALOPRAM OXALATE 10 MG/1
15 TABLET ORAL DAILY
Qty: 45 TABLET | Refills: 2 | Status: SHIPPED | OUTPATIENT
Start: 2023-11-16

## 2023-12-29 DIAGNOSIS — F41.0 PANIC ATTACK: ICD-10-CM

## 2023-12-29 DIAGNOSIS — F41.9 ANXIETY: ICD-10-CM

## 2023-12-29 NOTE — TELEPHONE ENCOUNTER
Next Office Visit 1/16/24      Patient is going to be out of the lexapro and needs a refill asap      He is also asking to take the ativan as well even though he reported to not be taking it anymore.

## 2023-12-30 RX ORDER — LORAZEPAM 0.5 MG/1
0.5 TABLET ORAL
Qty: 15 TABLET | Refills: 0 | Status: SHIPPED | OUTPATIENT
Start: 2023-12-30 | End: 2024-01-04 | Stop reason: SDUPTHER

## 2023-12-30 RX ORDER — ESCITALOPRAM OXALATE 10 MG/1
15 TABLET ORAL DAILY
Qty: 45 TABLET | Refills: 2 | Status: SHIPPED | OUTPATIENT
Start: 2023-12-30

## 2024-01-03 NOTE — TELEPHONE ENCOUNTER
Patient needs Ativan transferred to [facility] on [address] in [place]. He is leaving for vacation on Friday and would need it before then.

## 2024-01-04 DIAGNOSIS — F41.0 PANIC ATTACK: ICD-10-CM

## 2024-01-04 RX ORDER — LORAZEPAM 0.5 MG/1
0.5 TABLET ORAL
Qty: 15 TABLET | Refills: 0 | Status: SHIPPED | OUTPATIENT
Start: 2024-01-04

## 2024-02-14 ENCOUNTER — OFFICE VISIT (OUTPATIENT)
Dept: INTERNAL MEDICINE CLINIC | Age: 29
End: 2024-02-14

## 2024-02-14 VITALS
BODY MASS INDEX: 31.83 KG/M2 | SYSTOLIC BLOOD PRESSURE: 122 MMHG | HEART RATE: 81 BPM | TEMPERATURE: 98 F | DIASTOLIC BLOOD PRESSURE: 80 MMHG | OXYGEN SATURATION: 99 % | HEIGHT: 72 IN | WEIGHT: 235 LBS

## 2024-02-14 DIAGNOSIS — F41.9 ANXIETY: ICD-10-CM

## 2024-02-14 PROCEDURE — 99212 OFFICE O/P EST SF 10 MIN: CPT | Performed by: PHYSICIAN ASSISTANT

## 2024-02-14 RX ORDER — ESCITALOPRAM OXALATE 10 MG/1
15 TABLET ORAL DAILY
Qty: 45 TABLET | Refills: 2 | Status: SHIPPED | OUTPATIENT
Start: 2024-02-14

## 2024-02-14 NOTE — PROGRESS NOTES
Assessment/Plan:         Diagnoses and all orders for this visit:    Anxiety  Comments:  continue escitalopram 15mg daily   pt reports he has been sleeping well  Orders:  -     escitalopram (Lexapro) 10 mg tablet; Take 1.5 tablets (15 mg total) by mouth daily        Pt currently does not have insurance coverage, discussed potential to check labs due to weight gain. Pt will watch diet and work on exercise goals - may cut down on escitalpram to 10mg daily if he feels this is impacting his weight.  He will keep me updated regarding this   Subjective:      Patient ID: Saeed Caruso is a 28 y.o. male.    27 y/o male with hx of FUENTES presents for f/u   Pt reports he has been sleeping well   He has noted some weight gain over the past year - around 15 lbs  Pt reports he has not had to work out in the past but hasn't been doing so lately   He is not watching diet   Pt reports escitalopram has been working well for anxiety/depressive sx    Anxiety  Patient reports no chest pain, dizziness, nervous/anxious behavior or shortness of breath.           The following portions of the patient's history were reviewed and updated as appropriate: allergies, current medications, past family history, past medical history, past social history, past surgical history, and problem list.    Review of Systems   Constitutional:  Negative for appetite change, chills, diaphoresis and fever.   HENT:  Negative for congestion and sore throat.    Respiratory:  Negative for cough and shortness of breath.    Cardiovascular:  Negative for chest pain and leg swelling.   Gastrointestinal:  Negative for abdominal pain, constipation and diarrhea.   Musculoskeletal:  Negative for arthralgias and back pain.   Skin:  Negative for rash.   Neurological:  Negative for dizziness, light-headedness and headaches.   Psychiatric/Behavioral:  Negative for dysphoric mood and sleep disturbance. The patient is not nervous/anxious.          Past Medical History:   Diagnosis  Date    Anxiety     No known health problems          Current Outpatient Medications:     escitalopram (Lexapro) 10 mg tablet, Take 1.5 tablets (15 mg total) by mouth daily, Disp: 45 tablet, Rfl: 2    LORazepam (Ativan) 0.5 mg tablet, Take 1 tablet (0.5 mg total) by mouth daily at bedtime as needed for anxiety, Disp: 15 tablet, Rfl: 0    Allergies   Allergen Reactions    Nuts - Food Allergy      Almonds       Social History   Past Surgical History:   Procedure Laterality Date    NO PAST SURGERIES       Family History   Problem Relation Age of Onset    Thyroid disease Mother     Hypertension Father     Restless legs syndrome Father     Stroke Maternal Grandmother     Cancer Maternal Grandfather     Cancer Maternal Aunt     Bipolar disorder Paternal Aunt     Anxiety disorder Paternal Aunt        Objective:  /80 (BP Location: Left arm, Patient Position: Sitting, Cuff Size: Adult)   Pulse 81   Temp 98 °F (36.7 °C)   Ht 6' (1.829 m)   Wt 107 kg (235 lb)   SpO2 99%   BMI 31.87 kg/m²        Physical Exam  Vitals reviewed.   Constitutional:       General: He is not in acute distress.     Appearance: He is not toxic-appearing.   HENT:      Head: Normocephalic and atraumatic.      Nose: Nose normal.      Mouth/Throat:      Mouth: Mucous membranes are moist.   Eyes:      General:         Right eye: No discharge.         Left eye: No discharge.      Conjunctiva/sclera: Conjunctivae normal.   Cardiovascular:      Rate and Rhythm: Normal rate and regular rhythm.   Pulmonary:      Effort: Pulmonary effort is normal. No respiratory distress.      Breath sounds: Normal breath sounds. No wheezing.   Musculoskeletal:      Cervical back: Normal range of motion.   Neurological:      General: No focal deficit present.      Mental Status: He is alert and oriented to person, place, and time.   Psychiatric:         Mood and Affect: Mood normal.         Behavior: Behavior normal.

## 2024-06-23 DIAGNOSIS — F41.9 ANXIETY: ICD-10-CM

## 2024-06-24 RX ORDER — ESCITALOPRAM OXALATE 10 MG/1
15 TABLET ORAL DAILY
Qty: 135 TABLET | Refills: 1 | Status: SHIPPED | OUTPATIENT
Start: 2024-06-24

## 2024-08-14 ENCOUNTER — TELEMEDICINE (OUTPATIENT)
Dept: INTERNAL MEDICINE CLINIC | Age: 29
End: 2024-08-14
Payer: COMMERCIAL

## 2024-08-14 DIAGNOSIS — F41.9 ANXIETY: Primary | ICD-10-CM

## 2024-08-14 PROCEDURE — 99213 OFFICE O/P EST LOW 20 MIN: CPT | Performed by: PHYSICIAN ASSISTANT

## 2024-08-14 NOTE — PROGRESS NOTES
Virtual Regular Visit  Name: Saeed Caruso      : 1995      MRN: [id number]  Encounter Provider: Ade Thrasher PA-C  Encounter Date: 2024   Encounter department: [facility]    Verification of patient location:    Patient is located at Home in the following state in which I hold an active license [place]    Assessment & Plan   1. Anxiety  Comments:  continue current lexapro dose  has not taken lorazepam         Encounter provider Ade Thrasher PA-C    The patient was identified by name and date of birth. Saeed Caruso was informed that this is a telemedicine visit and that the visit is being conducted through the Epic Embedded platform. He agrees to proceed..  My office door was closed. No one else was in the room.  He acknowledged consent and understanding of privacy and security of the video platform. The patient has agreed to participate and understands they can discontinue the visit at any time.    Patient is aware this is a billable service.     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   HENT:  Negative for congestion and sore throat.    Eyes:  Negative for pain and redness.   Respiratory:  Negative for cough, shortness of breath and wheezing.    Cardiovascular:  Negative for chest pain and leg swelling.   Gastrointestinal:  Negative for constipation, diarrhea and  nausea.   Musculoskeletal:  Negative for arthralgias and back pain.   Skin:  Negative for rash.   Neurological:  Negative for dizziness, light-headedness and headaches.   Psychiatric/Behavioral:  Negative for decreased concentration, dysphoric mood and sleep disturbance (sleeps 6-7 hr/ night). The patient is not nervous/anxious.      Medical History Reviewed by provider this encounter:  Tobacco  Allergies  Meds  Problems  Med Hx  Surg Hx  Fam Hx       Objective     There were no vitals taken for this visit.  Physical Exam  Constitutional:       General: He is not in acute distress.  HENT:      Head: Normocephalic and atraumatic.      Nose: Nose normal.   Pulmonary:      Effort: Pulmonary effort is normal. No respiratory distress.   Neurological:      General: No focal deficit present.      Mental Status: He is alert.   Psychiatric:         Mood and Affect: Mood normal.         Visit Time  Total Visit Duration: 15 min

## 2025-04-01 ENCOUNTER — OFFICE VISIT (OUTPATIENT)
Dept: INTERNAL MEDICINE CLINIC | Age: 30
End: 2025-04-01

## 2025-04-01 VITALS
HEART RATE: 82 BPM | WEIGHT: 233 LBS | DIASTOLIC BLOOD PRESSURE: 78 MMHG | HEIGHT: 72 IN | TEMPERATURE: 97.4 F | OXYGEN SATURATION: 98 % | SYSTOLIC BLOOD PRESSURE: 122 MMHG | BODY MASS INDEX: 31.56 KG/M2

## 2025-04-01 DIAGNOSIS — F41.9 ANXIETY: ICD-10-CM

## 2025-04-01 DIAGNOSIS — K58.0 IRRITABLE BOWEL SYNDROME WITH DIARRHEA: Primary | ICD-10-CM

## 2025-04-01 PROCEDURE — 99213 OFFICE O/P EST LOW 20 MIN: CPT | Performed by: PHYSICIAN ASSISTANT

## 2025-04-01 RX ORDER — ESCITALOPRAM OXALATE 10 MG/1
15 TABLET ORAL DAILY
Qty: 135 TABLET | Refills: 3 | Status: SHIPPED | OUTPATIENT
Start: 2025-04-01

## 2025-04-01 NOTE — PROGRESS NOTES
Name: Saeed Caruso      : 1995      MRN: [id number]  Encounter Provider: Ade Thrasher PA-C  Encounter Date: 2025   Encounter department: [facility]  :  Assessment & Plan  Anxiety  Continue lexapro 15mg daily  Tolerating well  Has not used ativan   Orders:    escitalopram (Lexapro) 10 mg tablet; Take 1.5 tablets (15 mg total) by mouth daily    Irritable bowel syndrome with diarrhea  Avoid fried foods  Trial benefiber daily                 History of Present Illness   30 y/o male with hx of FUENTES presents for f/u  Pt states his anxiety has been controlled, tolerating lexapro well   Not using ativan - has not had any panic attacks or difficulty sleeping    Pt reports hx of IBS  Loose stools following meals   Pt denies ab pain, blood or mucus in stool, n/v  No specific food triggers that he can tell  We discussed potentially evaluating for food allergies in future but as pt does not have insurance currently will hold off  Advised to add daily fiber supplement         Review of Systems   Constitutional:  Negative for activity change, appetite change, chills, diaphoresis, fatigue and fever.   HENT:  Negative for congestion and sore throat.    Eyes:  Negative for pain and redness.   Respiratory:  Negative for cough, shortness of breath and wheezing.    Cardiovascular:  Negative for chest pain and leg swelling.   Gastrointestinal:  Positive for diarrhea. Negative for abdominal pain, constipation and nausea.   Genitourinary:  Negative for dysuria and flank pain.   Musculoskeletal:  Negative for arthralgias, back pain and gait problem.   Skin:  Negative for rash and wound.   Neurological:  Negative for dizziness and headaches.   Psychiatric/Behavioral:  Negative for sleep disturbance. The patient is not nervous/anxious.        Objective   /78 (BP Location: Left arm, Patient Position: Sitting, Cuff Size: Large)   Pulse 82   Temp (!) 97.4 °F (36.3 °C) (Temporal)   Ht 6'  (1.829 m)   Wt 106 kg (233 lb)   SpO2 98%   BMI 31.60 kg/m²      Physical Exam  Vitals reviewed.   Constitutional:       General: He is not in acute distress.  HENT:      Head: Normocephalic and atraumatic.      Right Ear: Tympanic membrane, ear canal and external ear normal. There is no impacted cerumen.      Left Ear: Tympanic membrane, ear canal and external ear normal. There is no impacted cerumen.      Nose: Nose normal.   Eyes:      General:         Right eye: No discharge.         Left eye: No discharge.      Extraocular Movements: Extraocular movements intact.      Conjunctiva/sclera: Conjunctivae normal.      Pupils: Pupils are equal, round, and reactive to light.   Cardiovascular:      Rate and Rhythm: Normal rate and regular rhythm.   Pulmonary:      Effort: Pulmonary effort is normal. No respiratory distress.      Breath sounds: Normal breath sounds. No wheezing or rales.   Musculoskeletal:         General: Normal range of motion.      Cervical back: Normal range of motion. No rigidity.      Right lower leg: No edema.      Left lower leg: No edema.   Lymphadenopathy:      Cervical: No cervical adenopathy.   Neurological:      General: No focal deficit present.      Mental Status: He is alert.      Cranial Nerves: No cranial nerve deficit.